# Patient Record
Sex: FEMALE | Race: OTHER | HISPANIC OR LATINO | Employment: UNEMPLOYED | URBAN - METROPOLITAN AREA
[De-identification: names, ages, dates, MRNs, and addresses within clinical notes are randomized per-mention and may not be internally consistent; named-entity substitution may affect disease eponyms.]

---

## 2022-09-14 ENCOUNTER — APPOINTMENT (OUTPATIENT)
Dept: OBGYN | Facility: CLINIC | Age: 27
End: 2022-09-14

## 2022-09-14 ENCOUNTER — GYNECOLOGY VISIT (OUTPATIENT)
Dept: OBGYN | Facility: CLINIC | Age: 27
End: 2022-09-14

## 2022-09-14 VITALS
SYSTOLIC BLOOD PRESSURE: 118 MMHG | DIASTOLIC BLOOD PRESSURE: 76 MMHG | WEIGHT: 145.5 LBS | HEIGHT: 61 IN | BODY MASS INDEX: 27.47 KG/M2

## 2022-09-14 DIAGNOSIS — N93.8 DUB (DYSFUNCTIONAL UTERINE BLEEDING): ICD-10-CM

## 2022-09-14 PROCEDURE — 99203 OFFICE O/P NEW LOW 30 MIN: CPT | Mod: 25 | Performed by: ADVANCED PRACTICE MIDWIFE

## 2022-09-14 PROCEDURE — 76801 OB US < 14 WKS SINGLE FETUS: CPT | Performed by: ADVANCED PRACTICE MIDWIFE

## 2022-09-14 NOTE — PROGRESS NOTES
"Poonam Turpin is a 27 y.o. female who presents for DUB        HPI Comments: Pt presents for DUB.  Patient's last menstrual period was 06/16/2022 (exact date).. She reports periods were regular.    Review of Systems   Pertinent positives documented in HPI and all other systems reviewed & are negative      History reviewed. No pertinent past medical history.    Medications:   No current [facility]-ordered outpatient medications on file.     No current [facility]-ordered facility-administered medications on file.          Objective:   Vital measurements:  /76 (BP Location: Right arm, Patient Position: Sitting, BP Cuff Size: Adult)   Ht 1.56 m (5' 1.42\")   Wt 66 kg (145 lb 8.1 oz)   Body mass index is 27.12 kg/m². (Goal BM I>18 <25)    Physical Exam   Nursing note and vitals reviewed.  Constitutional: She is oriented to person, place, and time. She appears well-developed and well-nourished. No distress.     Genitourinary:  Uterus size of 12   No vaginal bleeding or discharge noted.     Musculoskeletal: Normal range of motion. She exhibits no edema and no tenderness.     Skin: Skin is warm and dry. No rash noted. She is not diaphoretic. No erythema. No pallor.     Psychiatric: She has a normal mood and affect. Her behavior is normal. Judgment and thought content normal.     Transabdominal Ultrasound  Indication: DUB    Findings:  CRL:  6.21 6.22 6.38    FHR: 164 bpm      Impression: Single intrauterine pregnancy measuring 12 weeks and 5 days.     Per ACOG dating guidelines, final ERICK is 3/23/2023 based on LMP consistent with US today    Ultrasound performed and read by myself.              Assessment:     1. DUB (dysfunctional uterine bleeding)            Plan:   1. Discussed importance of prenatal vitamins with patient. Encouraged daily use.  2. Follow up in 1-2 weeks for NOB visit.         "

## 2022-09-14 NOTE — PROGRESS NOTES
Patient here for GYN/DUB.  UPT= Positive  LMP= 06/16/2022  ERICK= 03/23/2022  GA=12W6D  Last pap April [facility]   Phone number: [phone]  Pharmacy verified  No complaints as of today   Partners Name is Primo

## 2022-09-15 ENCOUNTER — APPOINTMENT (OUTPATIENT)
Dept: OBGYN | Facility: CLINIC | Age: 27
End: 2022-09-15

## 2022-09-27 PROBLEM — Z34.00 PREGNANCY, SUPERVISION OF FIRST: Status: ACTIVE | Noted: 2022-09-27

## 2022-10-05 ENCOUNTER — INITIAL PRENATAL (OUTPATIENT)
Dept: OBGYN | Facility: CLINIC | Age: 27
End: 2022-10-05

## 2022-10-05 ENCOUNTER — HOSPITAL ENCOUNTER (OUTPATIENT)
Facility: MEDICAL CENTER | Age: 27
End: 2022-10-05
Attending: NURSE PRACTITIONER
Payer: COMMERCIAL

## 2022-10-05 VITALS — WEIGHT: 151 LBS | DIASTOLIC BLOOD PRESSURE: 62 MMHG | BODY MASS INDEX: 28.15 KG/M2 | SYSTOLIC BLOOD PRESSURE: 100 MMHG

## 2022-10-05 DIAGNOSIS — Z34.00 ENCOUNTER FOR SUPERVISION PREGNANCY IN PRIMIGRAVIDA, ANTEPARTUM: ICD-10-CM

## 2022-10-05 PROCEDURE — 0500F INITIAL PRENATAL CARE VISIT: CPT | Performed by: NURSE PRACTITIONER

## 2022-10-05 PROCEDURE — 8198 PREG CTR PKG RATE (SYSTEM): Performed by: NURSE PRACTITIONER

## 2022-10-05 ASSESSMENT — EDINBURGH POSTNATAL DEPRESSION SCALE (EPDS)
I HAVE BEEN ANXIOUS OR WORRIED FOR NO GOOD REASON: NO, NOT AT ALL
I HAVE BLAMED MYSELF UNNECESSARILY WHEN THINGS WENT WRONG: NO, NEVER
I HAVE BEEN ABLE TO LAUGH AND SEE THE FUNNY SIDE OF THINGS: AS MUCH AS I ALWAYS COULD
THINGS HAVE BEEN GETTING ON TOP OF ME: NO, I HAVE BEEN COPING AS WELL AS EVER
I HAVE FELT SCARED OR PANICKY FOR NO GOOD REASON: NO, NOT AT ALL
I HAVE BEEN SO UNHAPPY THAT I HAVE BEEN CRYING: NO, NEVER
THE THOUGHT OF HARMING MYSELF HAS OCCURRED TO ME: NEVER
TOTAL SCORE: 0
I HAVE LOOKED FORWARD WITH ENJOYMENT TO THINGS: AS MUCH AS I EVER DID
I HAVE FELT SAD OR MISERABLE: NO, NOT AT ALL
I HAVE BEEN SO UNHAPPY THAT I HAVE HAD DIFFICULTY SLEEPING: NOT AT ALL

## 2022-10-05 NOTE — PROGRESS NOTES
Pt. Here for NOB visit.  Pt had a DUB on 9/14/2022  Pt states no complaints.   LMP 6/16/2022  Last pap smear 4/2022 [place] in [place], doesn't think she can get records, pt would like to wait till post partum to do pap smear.   FLU offered and declined.   AFP offered and declined.

## 2022-10-05 NOTE — PROGRESS NOTES
Subjective:   Poonam Turpin is a 27 y.o.  who presents for her new OB exam.  She is 15w6d with an ERICK of Estimated Date of Delivery: 3/23/23 by LMP she was tracking c/w US. She is feeling well and has no concerns at this time. Denies VB, LOF, contractions or pain. No ER visits or previous care in this pregnancy. Denies dysuria, vaginal DC, fever. Reports fetal movement. Declines AFP.  Declines CF.  Declines NIPT testing.     History reviewed. No pertinent past medical history.    Psych Hx: Patient denies any history of depression, anxiety, PTSD, bipolar or any other psychological issues.     EPDS today:     History reviewed. No pertinent surgical history.     OB History    Para Term  AB Living   1             SAB IAB Ectopic Molar Multiple Live Births                    # Outcome Date GA Lbr Jason/2nd Weight Sex Delivery Anes PTL Lv   1 Current                 Gynecological Hx: Denies any hx of STIs, including HSV. Denies any vulvovaginal disorders and no hx of abnormal cervical cytology. Last pap in [place] but no records.     Sexual Hx: One current male partner, who is FOB     Contraceptive Hx: Has not used in the past and has since discontinued use.     Family History   Problem Relation Age of Onset    No Known Problems Mother     No Known Problems Father     No Known Problems Sister     No Known Problems Brother     No Known Problems Maternal Grandmother     No Known Problems Maternal Grandfather     No Known Problems Paternal Grandmother     No Known Problems Paternal Grandfather      Denies any genetic disorders in family history.     Social History     Socioeconomic History    Marital status:      Spouse name: Not on file    Number of children: Not on file    Years of education: Not on file    Highest education level: Not on file   Occupational History    Not on file   Tobacco Use    Smoking status: Never    Smokeless tobacco: Never   Vaping Use    Vaping Use: Never  used   Substance and Sexual Activity    Alcohol use: Not Currently    Drug use: Never    Sexual activity: Yes     Partners: Male     Comment: none   Other Topics Concern    Not on file   Social History Narrative    Not on file     Social Determinants of Health     Financial Resource Strain: Not on file   Food Insecurity: Not on file   Transportation Needs: Not on file   Physical Activity: Not on file   Stress: Not on file   Social Connections: Not on file   Intimate Partner Violence: Not on file   Housing Stability: Not on file       FOB is involved and lives with Poonam Turpin.  Pregnancy is planned, was trying for 5 years.    She is currently not working, denies any heavy lifting or exposure to potential teratogens like environmental or occupational toxins.   Denies alcohol use, drug use, or tobacco use in pregnancy.   Denies any current or hx of sexual, emotional or physical abuse or trauma.     Current Medications: PNV   Allergies: Denies allergies to medications, food, or environmental allergies    Objective:      Vitals:    10/05/22 1032   BP: 100/62   Weight: 151 lb        See Prenatal Physical and Prenatal Vitals      Assessment:      1.  IUP @ 15w6d per LMP      2.  S=D      3.  See problem list as follows       Patient Active Problem List    Diagnosis Date Noted    Supervision of first pregnancy  09/27/2022         Plan:   - GC/CT collected; prefers pap PP  - Declines genetic testing   - Prenatal labs ordered - lab slip given  - Reviewed recommendations for Covid-19 vacc asap   - Discussed PNV, nutrition, adequate water intake, and exercise/weight gain in pregnancy  - NOB informational packet with anticipatory guidance given  - Information on Centering Pregnancy given, n/a  - S/sx of pregnancy warning signs and PTL precautions given  - Complete OB US in 5 wks  - Return to [facility] in 4 wks

## 2022-10-07 LAB
C TRACH DNA SPEC QL NAA+PROBE: NEGATIVE
N GONORRHOEA DNA SPEC QL NAA+PROBE: NEGATIVE
SPEC CONTAINER SPEC: NORMAL
SPEC CONTAINER SPEC: NORMAL
SPECIMEN SOURCE: NORMAL
T VAGINALIS RRNA SPEC QL NAA+PROBE: NEGATIVE

## 2022-10-26 ENCOUNTER — HOSPITAL ENCOUNTER (OUTPATIENT)
Dept: LAB | Facility: MEDICAL CENTER | Age: 27
End: 2022-10-26
Attending: NURSE PRACTITIONER
Payer: COMMERCIAL

## 2022-10-26 DIAGNOSIS — Z34.00 ENCOUNTER FOR SUPERVISION PREGNANCY IN PRIMIGRAVIDA, ANTEPARTUM: ICD-10-CM

## 2022-10-26 LAB
ABO GROUP BLD: NORMAL
APPEARANCE UR: CLEAR
BACTERIA #/AREA URNS HPF: NEGATIVE /HPF
BASOPHILS # BLD AUTO: 0.3 % (ref 0–1.8)
BASOPHILS # BLD: 0.03 K/UL (ref 0–0.12)
BILIRUB UR QL STRIP.AUTO: NEGATIVE
BLD GP AB SCN SERPL QL: NORMAL
COLOR UR: YELLOW
EOSINOPHIL # BLD AUTO: 0.14 K/UL (ref 0–0.51)
EOSINOPHIL NFR BLD: 1.4 % (ref 0–6.9)
EPI CELLS #/AREA URNS HPF: NORMAL /HPF
ERYTHROCYTE [DISTWIDTH] IN BLOOD BY AUTOMATED COUNT: 47.1 FL (ref 35.9–50)
GLUCOSE UR STRIP.AUTO-MCNC: NEGATIVE MG/DL
HBV SURFACE AG SER QL: ABNORMAL
HCT VFR BLD AUTO: 38 % (ref 37–47)
HCV AB SER QL: ABNORMAL
HGB BLD-MCNC: 12.2 G/DL (ref 12–16)
HIV 1+2 AB+HIV1 P24 AG SERPL QL IA: NORMAL
HYALINE CASTS #/AREA URNS LPF: NORMAL /LPF
IMM GRANULOCYTES # BLD AUTO: 0.05 K/UL (ref 0–0.11)
IMM GRANULOCYTES NFR BLD AUTO: 0.5 % (ref 0–0.9)
KETONES UR STRIP.AUTO-MCNC: NEGATIVE MG/DL
LEUKOCYTE ESTERASE UR QL STRIP.AUTO: ABNORMAL
LYMPHOCYTES # BLD AUTO: 3.1 K/UL (ref 1–4.8)
LYMPHOCYTES NFR BLD: 31.3 % (ref 22–41)
MCH RBC QN AUTO: 28.4 PG (ref 27–33)
MCHC RBC AUTO-ENTMCNC: 32.1 G/DL (ref 33.6–35)
MCV RBC AUTO: 88.6 FL (ref 81.4–97.8)
MICRO URNS: ABNORMAL
MONOCYTES # BLD AUTO: 0.57 K/UL (ref 0–0.85)
MONOCYTES NFR BLD AUTO: 5.8 % (ref 0–13.4)
NEUTROPHILS # BLD AUTO: 6.01 K/UL (ref 2–7.15)
NEUTROPHILS NFR BLD: 60.7 % (ref 44–72)
NITRITE UR QL STRIP.AUTO: NEGATIVE
NRBC # BLD AUTO: 0 K/UL
NRBC BLD-RTO: 0 /100 WBC
PH UR STRIP.AUTO: 7.5 [PH] (ref 5–8)
PLATELET # BLD AUTO: 251 K/UL (ref 164–446)
PMV BLD AUTO: 11.1 FL (ref 9–12.9)
PROT UR QL STRIP: NEGATIVE MG/DL
RBC # BLD AUTO: 4.29 M/UL (ref 4.2–5.4)
RBC # URNS HPF: NORMAL /HPF
RBC UR QL AUTO: NEGATIVE
RH BLD: NORMAL
RUBV AB SER QL: >500 IU/ML
SP GR UR STRIP.AUTO: 1.01
T PALLIDUM AB SER QL IA: ABNORMAL
UROBILINOGEN UR STRIP.AUTO-MCNC: 0.2 MG/DL
WBC # BLD AUTO: 9.9 K/UL (ref 4.8–10.8)
WBC #/AREA URNS HPF: NORMAL /HPF

## 2022-10-28 LAB
AMPHET CTO UR CFM-MCNC: NEGATIVE NG/ML
BACTERIA UR CULT: NORMAL
BARBITURATES CTO UR CFM-MCNC: NEGATIVE NG/ML
BENZODIAZ CTO UR CFM-MCNC: NEGATIVE NG/ML
CANNABINOIDS CTO UR CFM-MCNC: NEGATIVE NG/ML
COCAINE CTO UR CFM-MCNC: NEGATIVE NG/ML
DRUG COMMENT 753798: NORMAL
METHADONE CTO UR CFM-MCNC: NEGATIVE NG/ML
OPIATES CTO UR CFM-MCNC: NEGATIVE NG/ML
PCP CTO UR CFM-MCNC: NEGATIVE NG/ML
PROPOXYPH CTO UR CFM-MCNC: NEGATIVE NG/ML
SIGNIFICANT IND 70042: NORMAL
SITE SITE: NORMAL
SOURCE SOURCE: NORMAL

## 2022-11-04 ENCOUNTER — ROUTINE PRENATAL (OUTPATIENT)
Dept: OBGYN | Facility: CLINIC | Age: 27
End: 2022-11-04

## 2022-11-04 VITALS — DIASTOLIC BLOOD PRESSURE: 60 MMHG | SYSTOLIC BLOOD PRESSURE: 106 MMHG | WEIGHT: 153 LBS | BODY MASS INDEX: 28.52 KG/M2

## 2022-11-04 DIAGNOSIS — Z34.02 ENCOUNTER FOR PRENATAL CARE IN SECOND TRIMESTER OF FIRST PREGNANCY: ICD-10-CM

## 2022-11-04 PROCEDURE — 0502F SUBSEQUENT PRENATAL CARE: CPT | Performed by: NURSE PRACTITIONER

## 2022-11-04 NOTE — PROGRESS NOTES
OB follow up   +FM, Denies VB, LOF or UC's  Phone # [phone]  Preferred pharmacy confirmed  U/S scheduled for 11/10/22  Pt denies any problems at this time

## 2022-11-04 NOTE — PROGRESS NOTES
SUBJECTIVE:  Pt is a 27 y.o.   at 20w1d  gestation. Presents today for follow-up prenatal care. Reports no issues at this time.  Reports   fetal movement. Denies regular cramping/contractions, bleeding or leaking of fluid. Denies dysuria, headaches, N/V. Generally feels well today except some legs cramps at night when she drinks less water.      OBJECTIVE:  - See prenatal vitals flow  -   Vitals:    22 0738   BP: 106/60   Weight: 153 lb                 ASSESSMENT:   - IUP at 20w1d    - S=D   -   Patient Active Problem List    Diagnosis Date Noted    Supervision of first pregnancy  2022         PLAN:  - S/sx pregnancy and labor warning signs vs general discomforts discussed  - Fetal movements and/or kick counts reviewed   - Adequate hydration reinforced  - Nutrition/exercise/vitamin education; continue PNV  - Continues to decline genetic testing   - US scheduled  - Declines flu vac today as has a slight cough  - Anticipatory guidance given  - RTC in 4 weeks for follow-up prenatal care

## 2022-11-10 ENCOUNTER — APPOINTMENT (OUTPATIENT)
Dept: RADIOLOGY | Facility: IMAGING CENTER | Age: 27
End: 2022-11-10
Attending: NURSE PRACTITIONER

## 2022-11-10 ENCOUNTER — TELEPHONE (OUTPATIENT)
Dept: OBGYN | Facility: CLINIC | Age: 27
End: 2022-11-10

## 2022-11-10 DIAGNOSIS — Z34.00 ENCOUNTER FOR SUPERVISION PREGNANCY IN PRIMIGRAVIDA, ANTEPARTUM: ICD-10-CM

## 2022-11-10 DIAGNOSIS — Z34.82 ENCOUNTER FOR SUPERVISION OF OTHER NORMAL PREGNANCY IN SECOND TRIMESTER: ICD-10-CM

## 2022-11-10 PROCEDURE — 76817 TRANSVAGINAL US OBSTETRIC: CPT | Mod: TC | Performed by: PHYSICIAN ASSISTANT

## 2022-11-10 PROCEDURE — 76805 OB US >/= 14 WKS SNGL FETUS: CPT | Mod: TC | Performed by: PHYSICIAN ASSISTANT

## 2022-11-11 NOTE — TELEPHONE ENCOUNTER
----- Message from KRANTHI Swenson sent at 11/10/2022 12:01 PM PST -----  F/u US of heart in 3-4 weeks. Ordered please call to help pt schedule.     Pt notified of need to repeat US to recheck on baby's heart d/t limited views on last US. Explained to pt someone from our group will be calling her tomorrow or Monday to schedule her US appt. Advised to clal us back if she does not receive a call. Pt agreed and verbalized understanding.

## 2022-11-28 ENCOUNTER — ROUTINE PRENATAL (OUTPATIENT)
Dept: OBGYN | Facility: CLINIC | Age: 27
End: 2022-11-28

## 2022-11-28 VITALS — WEIGHT: 154 LBS | BODY MASS INDEX: 28.7 KG/M2 | DIASTOLIC BLOOD PRESSURE: 60 MMHG | SYSTOLIC BLOOD PRESSURE: 108 MMHG

## 2022-11-28 DIAGNOSIS — Z34.02 ENCOUNTER FOR PRENATAL CARE IN SECOND TRIMESTER OF FIRST PREGNANCY: ICD-10-CM

## 2022-11-28 PROCEDURE — 0502F SUBSEQUENT PRENATAL CARE: CPT | Performed by: NURSE PRACTITIONER

## 2022-11-28 NOTE — PROGRESS NOTES
OB follow up   + FM, Denies VB, LOF or UC's.  Phone # [phone]  Preferred pharmacy confirmed  Follow-Up U/S scheduled for 12/9/22  3rd trimester lab orders pended and instructions given to patient

## 2022-11-28 NOTE — PROGRESS NOTES
SUBJECTIVE:  Pt is a 27 y.o.   at 23w4d  gestation. Presents today for follow-up prenatal care. Reports no issues at this time.  Reports   fetal movement. Denies regular cramping/contractions, bleeding or leaking of fluid. Denies dysuria, headaches, N/V. Generally feels well today except some mid upper back discomfort.      OBJECTIVE:  - See prenatal vitals flow  -   Vitals:    22 0756   BP: 108/60   Weight: 154 lb                 ASSESSMENT:   - IUP at 23w4d    - S=D   -   Patient Active Problem List    Diagnosis Date Noted    Supervision of first pregnancy  2022         PLAN:  - S/sx pregnancy and labor warning signs vs general discomforts discussed  - Fetal movements and/or kick counts reviewed   - Adequate hydration reinforced  - Nutrition/exercise/vitamin education; continue PNV  - Declines flu and covid vacc  - Third tri labs ordered  - F/u US for heart structures scheduled   - Recommended support belt, warm/cold therapies and tylenol   - Anticipatory guidance given  - RTC in 4 weeks for follow-up prenatal care

## 2022-12-09 ENCOUNTER — APPOINTMENT (OUTPATIENT)
Dept: RADIOLOGY | Facility: IMAGING CENTER | Age: 27
End: 2022-12-09
Attending: NURSE PRACTITIONER

## 2022-12-09 DIAGNOSIS — Z34.82 ENCOUNTER FOR SUPERVISION OF OTHER NORMAL PREGNANCY IN SECOND TRIMESTER: ICD-10-CM

## 2022-12-09 PROCEDURE — 76815 OB US LIMITED FETUS(S): CPT | Mod: TC | Performed by: RADIOLOGY

## 2022-12-20 ENCOUNTER — TELEPHONE (OUTPATIENT)
Dept: OBGYN | Facility: CLINIC | Age: 27
End: 2022-12-20

## 2022-12-20 ENCOUNTER — HOSPITAL ENCOUNTER (OUTPATIENT)
Dept: LAB | Facility: MEDICAL CENTER | Age: 27
End: 2022-12-20
Attending: NURSE PRACTITIONER
Payer: COMMERCIAL

## 2022-12-20 DIAGNOSIS — Z34.02 ENCOUNTER FOR PRENATAL CARE IN SECOND TRIMESTER OF FIRST PREGNANCY: ICD-10-CM

## 2022-12-20 DIAGNOSIS — R73.09 ELEVATED GLUCOSE TOLERANCE TEST: ICD-10-CM

## 2022-12-20 LAB
BASOPHILS # BLD AUTO: 0.2 % (ref 0–1.8)
BASOPHILS # BLD: 0.02 K/UL (ref 0–0.12)
EOSINOPHIL # BLD AUTO: 0.11 K/UL (ref 0–0.51)
EOSINOPHIL NFR BLD: 1.2 % (ref 0–6.9)
ERYTHROCYTE [DISTWIDTH] IN BLOOD BY AUTOMATED COUNT: 47.3 FL (ref 35.9–50)
GLUCOSE 1H P 50 G GLC PO SERPL-MCNC: 150 MG/DL (ref 70–139)
HCT VFR BLD AUTO: 34.8 % (ref 37–47)
HGB BLD-MCNC: 11 G/DL (ref 12–16)
IMM GRANULOCYTES # BLD AUTO: 0.06 K/UL (ref 0–0.11)
IMM GRANULOCYTES NFR BLD AUTO: 0.6 % (ref 0–0.9)
LYMPHOCYTES # BLD AUTO: 2.33 K/UL (ref 1–4.8)
LYMPHOCYTES NFR BLD: 24.9 % (ref 22–41)
MCH RBC QN AUTO: 28.6 PG (ref 27–33)
MCHC RBC AUTO-ENTMCNC: 31.6 G/DL (ref 33.6–35)
MCV RBC AUTO: 90.6 FL (ref 81.4–97.8)
MONOCYTES # BLD AUTO: 0.44 K/UL (ref 0–0.85)
MONOCYTES NFR BLD AUTO: 4.7 % (ref 0–13.4)
NEUTROPHILS # BLD AUTO: 6.39 K/UL (ref 2–7.15)
NEUTROPHILS NFR BLD: 68.4 % (ref 44–72)
NRBC # BLD AUTO: 0 K/UL
NRBC BLD-RTO: 0 /100 WBC
PLATELET # BLD AUTO: 255 K/UL (ref 164–446)
PMV BLD AUTO: 10.5 FL (ref 9–12.9)
RBC # BLD AUTO: 3.84 M/UL (ref 4.2–5.4)
T PALLIDUM AB SER QL IA: NORMAL
WBC # BLD AUTO: 9.4 K/UL (ref 4.8–10.8)

## 2022-12-21 NOTE — TELEPHONE ENCOUNTER
----- Message from KRANTHI Martinez sent at 12/20/2022  1:29 PM PST -----  Elevated 1 hr GTT-> 3 hr ordered        12/20/22  1708 Left message for pt to call back regarding lab results.   12/21/22  0814 Pt notified of abnormal 1hr gtt and need to do 3hr gtt this time. Pt instructed to fast 10-12hrs prior to testing. Pt informed she is only allow to drink plain water during fasting time. Pt will call lab to schedule an appt. Advised to bring a snack for after the test is done. Pt notified will be staying in the labs for the 3hr. Pt agreed to do it Friday 12/23/2022. Pt verbalized understanding.

## 2022-12-23 ENCOUNTER — HOSPITAL ENCOUNTER (OUTPATIENT)
Dept: LAB | Facility: MEDICAL CENTER | Age: 27
End: 2022-12-23
Attending: NURSE PRACTITIONER
Payer: COMMERCIAL

## 2022-12-23 DIAGNOSIS — R73.09 ELEVATED GLUCOSE TOLERANCE TEST: ICD-10-CM

## 2022-12-24 LAB
GLUCOSE 1H P CHAL SERPL-MCNC: 148 MG/DL (ref 65–180)
GLUCOSE 2H P CHAL SERPL-MCNC: 103 MG/DL (ref 65–155)
GLUCOSE 3H P CHAL SERPL-MCNC: 105 MG/DL (ref 65–140)
GLUCOSE BS SERPL-MCNC: 75 MG/DL (ref 65–95)

## 2022-12-29 ENCOUNTER — ROUTINE PRENATAL (OUTPATIENT)
Dept: OBGYN | Facility: CLINIC | Age: 27
End: 2022-12-29

## 2022-12-29 VITALS — SYSTOLIC BLOOD PRESSURE: 112 MMHG | WEIGHT: 161 LBS | DIASTOLIC BLOOD PRESSURE: 60 MMHG | BODY MASS INDEX: 30.01 KG/M2

## 2022-12-29 DIAGNOSIS — Z34.83 ENCOUNTER FOR SUPERVISION OF OTHER NORMAL PREGNANCY IN THIRD TRIMESTER: Primary | ICD-10-CM

## 2022-12-29 PROCEDURE — 0502F SUBSEQUENT PRENATAL CARE: CPT | Performed by: NURSE PRACTITIONER

## 2022-12-29 NOTE — PROGRESS NOTES
OB follow up   + fetal movement.  No VB, LOF or UC's.  Phone # [phone]  Preferred pharmacy confirmed.  TDap vaccine offered but declined  Kick count sheet given today.

## 2022-12-29 NOTE — LETTER
Cuente los Movimientos de lopez Bebé  Otro paso importante para la verónica de lopez bebé    Poonam Yuliya Turpin     [facility]            Dept: [phone]    ¿Cuántas semanas tiene de embarazo? 28w0d    Fecha cuando tiene que comenzar a contar el movimiento: 12/29/22                  [place] debe usar kirby diagrama    Claudia manera en que lopez doctor puede controlar a verónica de lopez bebé es sabiendo cuantas veces se mueve lopez bebé en el útero, o por medio de las “pataditas”.  Usted podrá ayudarle a lopez médico al usar cada día el siguiente diagrama.    Cada día, usted debe prestar atención a cuantas horas le lleva a lopez bebé moverse 10 veces.  Comience a contar en la mañana, lo antes posible después de haberse levantado.    · Primeramente, escriba la hora en que se mueve lopez bebé, hasta llegar a 10 veces.  · Colóquele un check o palomita a cada cuadrito cada vez que lopez bebé se mueva hasta que complete 10 veces.  · Escriba la hora cuando termine de contar 10 veces en la última columna.  · Sume el total del tiempo que le llevó contar los 10 movimientos.  · Finalmente, complete el cuadrito de cuantas horas le llevó hacerlo.    Después de saadia contado los 10 movimientos, ya no tendrá que contar los demás movimientos por el shaan del día.  A la mañana siguiente, comience a contar de nuevo cuantas veces se mueve el bebé desde el momento en que se levante.    ¿Qué tendría que considerarse un “movimiento”?  Es difícil de decirlo porque es distinto de claudia madre a otra, y de un embarazo a otro.  Lo importante es que cuente el movimiento de la misma manera anabela el transcurso de lopez embarazo.  Si tiene preguntas adicionales, pregúntele a lopez doctor.    ¡Cuente cuidadosamente cada día!     MUESTRA:  Semana 28    ¿Cuántas horas le ha llevado sentir 10 movimientos?        Hora de Inicio     1     2     3     4     5     6     7     8     9     10   Hora de Finlizar   Saji. 8:20 ·  ·  ·  ·  ·  ·  ·  ·  ·  ·   11:40   Mar.               Mié.               Flory.               Abbye.               Sáb.               Dom.                 IMPORTANTE:  Usted debe contactar a lopez doctor si le lleva más de 2 horas sentir 10 movimientos de lopez bebé.    Cada mañana, escriba la hora de inicio y comience a contar los movimientos de lopez bebé.  Hágalo colocándole un check o palomita a cada cuadrito cada vez que sienta un movimiento de lopez bebé.  Cuando haya sentido 10 “pataditas”, escriba la hora en que terminó de contar en la última columna.  Luego, complete en la cajita (arriba de la fanny de check o palomita) el número total de horas que le llevó hacerlo.  Asegúrese de leer completamente las instrucciones en la página anterior.

## 2023-01-12 ENCOUNTER — ROUTINE PRENATAL (OUTPATIENT)
Dept: OBGYN | Facility: CLINIC | Age: 28
End: 2023-01-12

## 2023-01-12 VITALS — SYSTOLIC BLOOD PRESSURE: 100 MMHG | BODY MASS INDEX: 30.01 KG/M2 | WEIGHT: 161 LBS | DIASTOLIC BLOOD PRESSURE: 60 MMHG

## 2023-01-12 DIAGNOSIS — Z34.03 ENCOUNTER FOR PRENATAL CARE IN THIRD TRIMESTER OF FIRST PREGNANCY: ICD-10-CM

## 2023-01-12 PROCEDURE — 0502F SUBSEQUENT PRENATAL CARE: CPT | Performed by: NURSE PRACTITIONER

## 2023-01-12 NOTE — PROGRESS NOTES
Pt here today for OB follow up  Pt states no complaints   Reports +  Good # [phone]  Pharmacy Confirmed.  Chaperone offered and not indicated.

## 2023-01-12 NOTE — PROGRESS NOTES
SUBJECTIVE:  Pt is a 27 y.o.   at 30w0d  gestation. Presents today for follow-up prenatal care. Reports no issues at this time.  Reports good  fetal movement. Denies regular cramping/contractions, bleeding or leaking of fluid. Denies dysuria, headaches, N/V. Generally feels well today.     OBJECTIVE:  - See prenatal vitals flow  -   Vitals:    23 0834   BP: 100/60   Weight: 161 lb                 ASSESSMENT:   - IUP at 30w0d    - S=D   -   Patient Active Problem List    Diagnosis Date Noted    Supervision of first pregnancy  2022         PLAN:  - S/sx pregnancy and labor warning signs vs general discomforts discussed  - Fetal movements and/or kick counts reviewed   - Adequate hydration reinforced  - Nutrition/exercise/vitamin education; continue PNV  - Plans for breastfeeding:handout given and reviewed   - Plans unsure for contraception Pp: handout given and reviewed  - Declines vaccines  - Anticipatory guidance given  - RTC in 2 weeks for follow-up prenatal care

## 2023-01-24 ENCOUNTER — ROUTINE PRENATAL (OUTPATIENT)
Dept: OBGYN | Facility: CLINIC | Age: 28
End: 2023-01-24

## 2023-01-24 VITALS — SYSTOLIC BLOOD PRESSURE: 96 MMHG | BODY MASS INDEX: 29.97 KG/M2 | DIASTOLIC BLOOD PRESSURE: 58 MMHG | WEIGHT: 160.8 LBS

## 2023-01-24 DIAGNOSIS — Z34.03 ENCOUNTER FOR PRENATAL CARE IN THIRD TRIMESTER OF FIRST PREGNANCY: ICD-10-CM

## 2023-01-24 PROCEDURE — 0502F SUBSEQUENT PRENATAL CARE: CPT | Performed by: NURSE PRACTITIONER

## 2023-01-24 NOTE — PROGRESS NOTES
Pt. Here for OB/FU. Reports Good FM.   Pt. Denies VB, LOF, or UC's.   Pt states she has no concerns or complaints today.   Baby is meeting LINETTE movement goals.

## 2023-01-24 NOTE — PROGRESS NOTES
SUBJECTIVE:  Pt is a 27 y.o.   at 31w5d  gestation. Presents today for follow-up prenatal care. Reports no issues at this time.  Reports good  fetal movement. Denies regular cramping/contractions, bleeding or leaking of fluid. Denies dysuria, headaches, N/V. Generally feels well today.      OBJECTIVE:  - See prenatal vitals flow  -   Vitals:    23 0938   BP: 96/58   Weight: 160 lb 12.8 oz                 ASSESSMENT:   - IUP at 31w5d    - S=D   -   Patient Active Problem List    Diagnosis Date Noted    Supervision of first pregnancy  2022         PLAN:  - S/sx pregnancy and labor warning signs vs general discomforts discussed  - Fetal movements and/or kick counts reviewed   - Adequate hydration reinforced  - Education provided on where to do classes and tours   - Nutrition/exercise/vitamin education; continue PNV  - Anticipatory guidance given  - RTC in 2 weeks for follow-up prenatal care

## 2023-02-09 ENCOUNTER — ROUTINE PRENATAL (OUTPATIENT)
Dept: OBGYN | Facility: CLINIC | Age: 28
End: 2023-02-09

## 2023-02-09 VITALS — SYSTOLIC BLOOD PRESSURE: 102 MMHG | DIASTOLIC BLOOD PRESSURE: 60 MMHG | BODY MASS INDEX: 30.42 KG/M2 | WEIGHT: 163.2 LBS

## 2023-02-09 DIAGNOSIS — Z34.03 ENCOUNTER FOR PRENATAL CARE IN THIRD TRIMESTER OF FIRST PREGNANCY: ICD-10-CM

## 2023-02-09 PROCEDURE — 0502F SUBSEQUENT PRENATAL CARE: CPT | Performed by: NURSE PRACTITIONER

## 2023-02-09 NOTE — PROGRESS NOTES
SUBJECTIVE:  Pt is a 28 y.o.   at 34w0d  gestation. Presents today for follow-up prenatal care. Reports no issues at this time.  Reports good  fetal movement. Denies regular cramping/contractions, bleeding or leaking of fluid. Denies dysuria, headaches, N/V. Generally feels well today except one episode of lower abdominal pain lasting a few minutes.      OBJECTIVE:  - See prenatal vitals flow  -   Vitals:    23 0737   BP: 102/60   Weight: 163 lb 3.2 oz                 ASSESSMENT:   - IUP at 34w0d    - S=D   -   Patient Active Problem List    Diagnosis Date Noted    Supervision of first pregnancy  2022         PLAN:  - S/sx pregnancy and labor warning signs vs general discomforts discussed  - Fetal movements and/or kick counts reviewed   - Adequate hydration reinforced  - Nutrition/exercise/vitamin education; continue PNV   - Anticipatory guidance given  - Ligament discomforts and remedies reviewed   - RTC in 2 weeks for follow-up prenatal care

## 2023-02-09 NOTE — PROGRESS NOTES
Pt here today for OB follow up  Reports +FM  WT: 163.2 lb   BP: 102/60  Preferred pharmacy verified with pt.  MFM Appointment: not at this time   Pt states no complaints or concerns today  Good # [phone]

## 2023-02-24 ENCOUNTER — HOSPITAL ENCOUNTER (OUTPATIENT)
Facility: MEDICAL CENTER | Age: 28
End: 2023-02-24
Attending: NURSE PRACTITIONER
Payer: COMMERCIAL

## 2023-02-24 ENCOUNTER — ROUTINE PRENATAL (OUTPATIENT)
Dept: OBGYN | Facility: CLINIC | Age: 28
End: 2023-02-24

## 2023-02-24 VITALS — DIASTOLIC BLOOD PRESSURE: 64 MMHG | WEIGHT: 164 LBS | SYSTOLIC BLOOD PRESSURE: 108 MMHG | BODY MASS INDEX: 30.57 KG/M2

## 2023-02-24 DIAGNOSIS — Z34.83 ENCOUNTER FOR SUPERVISION OF OTHER NORMAL PREGNANCY IN THIRD TRIMESTER: Primary | ICD-10-CM

## 2023-02-24 DIAGNOSIS — Z34.83 ENCOUNTER FOR SUPERVISION OF OTHER NORMAL PREGNANCY IN THIRD TRIMESTER: ICD-10-CM

## 2023-02-24 LAB
APPEARANCE UR: NORMAL
BILIRUB UR STRIP-MCNC: NORMAL MG/DL
COLOR UR AUTO: NORMAL
GLUCOSE UR STRIP.AUTO-MCNC: NEGATIVE MG/DL
KETONES UR STRIP.AUTO-MCNC: NORMAL MG/DL
LEUKOCYTE ESTERASE UR QL STRIP.AUTO: NORMAL
NITRITE UR QL STRIP.AUTO: NEGATIVE
PH UR STRIP.AUTO: 6 [PH] (ref 5–8)
PROT UR QL STRIP: 30 MG/DL
RBC UR QL AUTO: NEGATIVE
SP GR UR STRIP.AUTO: 1.02
UROBILINOGEN UR STRIP-MCNC: NORMAL MG/DL

## 2023-02-24 PROCEDURE — 0502F SUBSEQUENT PRENATAL CARE: CPT | Performed by: NURSE PRACTITIONER

## 2023-02-24 PROCEDURE — 81002 URINALYSIS NONAUTO W/O SCOPE: CPT | Performed by: NURSE PRACTITIONER

## 2023-02-24 NOTE — PROGRESS NOTES
OB follow up   + fetal movement.  No VB, LOF or UC's.  Phone # [phone]  Preferred pharmacy confirmed.  GBS today

## 2023-02-25 LAB — GP B STREP DNA SPEC QL NAA+PROBE: NEGATIVE

## 2023-02-28 ENCOUNTER — ROUTINE PRENATAL (OUTPATIENT)
Dept: OBGYN | Facility: CLINIC | Age: 28
End: 2023-02-28

## 2023-02-28 VITALS — WEIGHT: 163.2 LBS | SYSTOLIC BLOOD PRESSURE: 102 MMHG | DIASTOLIC BLOOD PRESSURE: 60 MMHG | BODY MASS INDEX: 30.42 KG/M2

## 2023-02-28 DIAGNOSIS — Z34.03 ENCOUNTER FOR PRENATAL CARE IN THIRD TRIMESTER OF FIRST PREGNANCY: ICD-10-CM

## 2023-02-28 PROCEDURE — 0502F SUBSEQUENT PRENATAL CARE: CPT | Performed by: NURSE PRACTITIONER

## 2023-02-28 ASSESSMENT — EDINBURGH POSTNATAL DEPRESSION SCALE (EPDS)
THINGS HAVE BEEN GETTING ON TOP OF ME: NO, I HAVE BEEN COPING AS WELL AS EVER
I HAVE FELT SAD OR MISERABLE: NO, NOT AT ALL
THE THOUGHT OF HARMING MYSELF HAS OCCURRED TO ME: NEVER
I HAVE BEEN ANXIOUS OR WORRIED FOR NO GOOD REASON: NO, NOT AT ALL
I HAVE BLAMED MYSELF UNNECESSARILY WHEN THINGS WENT WRONG: NO, NEVER
TOTAL SCORE: 0
I HAVE BEEN ABLE TO LAUGH AND SEE THE FUNNY SIDE OF THINGS: AS MUCH AS I ALWAYS COULD
I HAVE BEEN SO UNHAPPY THAT I HAVE HAD DIFFICULTY SLEEPING: NOT AT ALL
I HAVE LOOKED FORWARD WITH ENJOYMENT TO THINGS: AS MUCH AS I EVER DID
I HAVE BEEN SO UNHAPPY THAT I HAVE BEEN CRYING: NO, NEVER
I HAVE FELT SCARED OR PANICKY FOR NO GOOD REASON: NO, NOT AT ALL

## 2023-02-28 NOTE — PROGRESS NOTES
SUBJECTIVE:  Pt is a 28 y.o.   at 36w5d  gestation. Presents today for follow-up prenatal care. Reports no issues at this time.  Reports good  fetal movement. Denies regular cramping/contractions, bleeding or leaking of fluid. Denies dysuria, headaches, N/V. Generally feels well today.     OBJECTIVE:  - See prenatal vitals flow  -   Vitals:    23 1157   BP: 102/60   Weight: 163 lb 3.2 oz                 ASSESSMENT:   - IUP at 36w5d    - S=D   -   Patient Active Problem List    Diagnosis Date Noted    Supervision of first pregnancy  2022         PLAN:  - S/sx pregnancy and labor warning signs vs general discomforts discussed  - Fetal movements and/or kick counts reviewed   - Adequate hydration reinforced  - Nutrition/exercise/vitamin education; continue PNV  - GBS negative  - Anticipatory guidance given  - RTC in 1 weeks for follow-up prenatal care

## 2023-03-09 ENCOUNTER — ROUTINE PRENATAL (OUTPATIENT)
Dept: OBGYN | Facility: CLINIC | Age: 28
End: 2023-03-09

## 2023-03-09 VITALS — WEIGHT: 164 LBS | BODY MASS INDEX: 30.57 KG/M2 | DIASTOLIC BLOOD PRESSURE: 66 MMHG | SYSTOLIC BLOOD PRESSURE: 108 MMHG

## 2023-03-09 DIAGNOSIS — Z34.03 ENCOUNTER FOR PRENATAL CARE IN THIRD TRIMESTER OF FIRST PREGNANCY: ICD-10-CM

## 2023-03-09 PROCEDURE — 0502F SUBSEQUENT PRENATAL CARE: CPT | Performed by: NURSE PRACTITIONER

## 2023-03-09 NOTE — PROGRESS NOTES
Pt. Here for OB/FU. Reports Good FM.   Pt. Denies VB, LOF, or UC's.   Pt states she has no concerns today.        ID: [id number]

## 2023-03-09 NOTE — PROGRESS NOTES
SUBJECTIVE:  Pt is a 28 y.o.   at 38w0d  gestation. Presents today for follow-up prenatal care. Reports no issues at this time.  Reports good  fetal movement. Denies regular cramping/contractions, bleeding or leaking of fluid. Denies dysuria, headaches, N/V. Generally feels well today.     OBJECTIVE:  - See prenatal vitals flow  -   Vitals:    23 1411   BP: 108/66   Weight: 164 lb                 ASSESSMENT:   - IUP at 38w0d    - S=D   -   Patient Active Problem List    Diagnosis Date Noted    Supervision of first pregnancy  2022         PLAN:  - S/sx pregnancy and labor warning signs vs general discomforts discussed  - Fetal movements and/or kick counts reviewed   - Adequate hydration reinforced  - Nutrition/exercise/vitamin education; continue PNV  - Desires IOL at 41 weeks  - Growth US ordered   - Anticipatory guidance given  - RTC in 1 weeks for follow-up prenatal care

## 2023-03-15 ENCOUNTER — ROUTINE PRENATAL (OUTPATIENT)
Dept: OBGYN | Facility: CLINIC | Age: 28
End: 2023-03-15

## 2023-03-15 VITALS — WEIGHT: 165.2 LBS | SYSTOLIC BLOOD PRESSURE: 106 MMHG | DIASTOLIC BLOOD PRESSURE: 58 MMHG | BODY MASS INDEX: 30.79 KG/M2

## 2023-03-15 DIAGNOSIS — Z34.03 ENCOUNTER FOR PRENATAL CARE IN THIRD TRIMESTER OF FIRST PREGNANCY: ICD-10-CM

## 2023-03-15 PROCEDURE — 0502F SUBSEQUENT PRENATAL CARE: CPT

## 2023-03-15 NOTE — PROGRESS NOTES
S:  Poonam here with FOB for routine OB follow up.  Reports good FM.  Denies VB, LOF, RUCs, or vaginal DC. Patient requests VE: Yes. Informed consent for vaginal exam. Patient agrees to vaginal exam: Yes    O:    Vitals:    03/15/23 1538   BP: 106/58   Weight: 165 lb 3.2 oz     See flow sheet.  VE: too posterior to reach, not well tolerated by patient, fetal station -1 to 0    A:    IUP at 38w6d  S<D, suspect due to very low fetal station, growth US scheduled for 3/17  Patient Active Problem List    Diagnosis Date Noted    Supervision of first pregnancy  09/27/2022       P:  1.  Continue FKCs.         2.  Labor precautions given.  Instructions given on where to go.  Pt receptive to education.         3.  D/w pt induction of labor indications, risks/benefits, and recommendations.  We reviewed the process and expectation. All questions answered.        4.  Questions answered.         5.  Encouraged adequate water intake       6.  GBS negative - reviewed w pt.       7.  F/u 1wk and PRN    MEGAN SaldanaNROBERT.

## 2023-03-15 NOTE — PROGRESS NOTES
Pt here today for OB follow up  Negative GBS, pt informed   Reports +FM  WT: 165.2 lb  BP: 106/58  Preferred pharmacy verified with pt.  MFM Appointment: not at this time   Pt states no complaints or concerns today  Patient is scheduled got GEL on 3/30/23 at 9pm and IOL on 3/31/23 at 9am.Pt notified and instructions given today  Good # [phone]

## 2023-03-17 ENCOUNTER — APPOINTMENT (OUTPATIENT)
Dept: RADIOLOGY | Facility: IMAGING CENTER | Age: 28
End: 2023-03-17
Attending: NURSE PRACTITIONER

## 2023-03-17 DIAGNOSIS — Z34.03 ENCOUNTER FOR PRENATAL CARE IN THIRD TRIMESTER OF FIRST PREGNANCY: ICD-10-CM

## 2023-03-17 PROCEDURE — 76816 OB US FOLLOW-UP PER FETUS: CPT | Mod: TC | Performed by: PHYSICIAN ASSISTANT

## 2023-03-22 ENCOUNTER — ROUTINE PRENATAL (OUTPATIENT)
Dept: OBGYN | Facility: CLINIC | Age: 28
End: 2023-03-22

## 2023-03-22 VITALS — DIASTOLIC BLOOD PRESSURE: 68 MMHG | SYSTOLIC BLOOD PRESSURE: 112 MMHG | WEIGHT: 165.6 LBS | BODY MASS INDEX: 30.87 KG/M2

## 2023-03-22 DIAGNOSIS — Z34.03 ENCOUNTER FOR PRENATAL CARE IN THIRD TRIMESTER OF FIRST PREGNANCY: ICD-10-CM

## 2023-03-22 PROCEDURE — 0502F SUBSEQUENT PRENATAL CARE: CPT | Performed by: NURSE PRACTITIONER

## 2023-03-22 NOTE — PROGRESS NOTES
Pt here today for OB follow up  Negative GBS, pt informed  Reports +FM  WT:165.6 lb  BP: 112/68  Preferred pharmacy verified with pt.  MFM Appointment: not at this time   Pt states has been feeling pelvic pressure. States no other complaints or concerns today  Good # [phone]    Patient is scheduled got GEL on Thursday 3/30/23 at 9pm and IOL on Friday 3/31/23 at 9am. Pt has been previously informed.

## 2023-03-22 NOTE — PROGRESS NOTES
SUBJECTIVE:  Pt is a 28 y.o.   at 39w6d  gestation. Presents today for follow-up prenatal care. Reports no issues at this time.  Reports good  fetal movement. Denies regular cramping/contractions, bleeding or leaking of fluid. Denies dysuria, headaches, N/V. Generally feels well today except a lot of pelvic pressure.      OBJECTIVE:  - See prenatal vitals flow  -   Vitals:    23 1424   BP: 112/68   Weight: 165 lb 9.6 oz                 ASSESSMENT:   - IUP at 39w6d    - S=D   - vtx by BSUS  Patient Active Problem List    Diagnosis Date Noted    Supervision of first pregnancy  2022         PLAN:  - S/sx pregnancy and labor warning signs vs general discomforts discussed  - Fetal movements and/or kick counts reviewed   - Adequate hydration reinforced  - Nutrition/exercise/vitamin education; continue PNV  - IOL on 3/30 and 3/31  - RTC PRN

## 2023-03-23 ENCOUNTER — HOSPITAL ENCOUNTER (INPATIENT)
Facility: MEDICAL CENTER | Age: 28
LOS: 2 days | End: 2023-03-25
Attending: OBSTETRICS & GYNECOLOGY | Admitting: OBSTETRICS & GYNECOLOGY
Payer: MEDICAID

## 2023-03-23 LAB
BASOPHILS # BLD AUTO: 0.3 % (ref 0–1.8)
BASOPHILS # BLD: 0.04 K/UL (ref 0–0.12)
EOSINOPHIL # BLD AUTO: 0.03 K/UL (ref 0–0.51)
EOSINOPHIL NFR BLD: 0.2 % (ref 0–6.9)
ERYTHROCYTE [DISTWIDTH] IN BLOOD BY AUTOMATED COUNT: 47.7 FL (ref 35.9–50)
HCT VFR BLD AUTO: 34.6 % (ref 37–47)
HGB BLD-MCNC: 11.5 G/DL (ref 12–16)
HOLDING TUBE BB 8507: NORMAL
IMM GRANULOCYTES # BLD AUTO: 0.07 K/UL (ref 0–0.11)
IMM GRANULOCYTES NFR BLD AUTO: 0.5 % (ref 0–0.9)
LYMPHOCYTES # BLD AUTO: 2.32 K/UL (ref 1–4.8)
LYMPHOCYTES NFR BLD: 15.9 % (ref 22–41)
MCH RBC QN AUTO: 27.9 PG (ref 27–33)
MCHC RBC AUTO-ENTMCNC: 33.2 G/DL (ref 33.6–35)
MCV RBC AUTO: 84 FL (ref 81.4–97.8)
MONOCYTES # BLD AUTO: 0.77 K/UL (ref 0–0.85)
MONOCYTES NFR BLD AUTO: 5.3 % (ref 0–13.4)
NEUTROPHILS # BLD AUTO: 11.38 K/UL (ref 2–7.15)
NEUTROPHILS NFR BLD: 77.8 % (ref 44–72)
NRBC # BLD AUTO: 0 K/UL
NRBC BLD-RTO: 0 /100 WBC
PLATELET # BLD AUTO: 247 K/UL (ref 164–446)
PMV BLD AUTO: 10 FL (ref 9–12.9)
RBC # BLD AUTO: 4.12 M/UL (ref 4.2–5.4)
T PALLIDUM AB SER QL IA: NORMAL
WBC # BLD AUTO: 14.6 K/UL (ref 4.8–10.8)

## 2023-03-23 PROCEDURE — 99283 EMERGENCY DEPT VISIT LOW MDM: CPT

## 2023-03-23 PROCEDURE — 700111 HCHG RX REV CODE 636 W/ 250 OVERRIDE (IP)

## 2023-03-23 PROCEDURE — 59025 FETAL NON-STRESS TEST: CPT | Mod: 26

## 2023-03-23 PROCEDURE — 770002 HCHG ROOM/CARE - OB PRIVATE (112)

## 2023-03-23 PROCEDURE — 36415 COLL VENOUS BLD VENIPUNCTURE: CPT

## 2023-03-23 PROCEDURE — 85025 COMPLETE CBC W/AUTO DIFF WBC: CPT

## 2023-03-23 PROCEDURE — 99283 EMERGENCY DEPT VISIT LOW MDM: CPT | Mod: 25

## 2023-03-23 PROCEDURE — 86780 TREPONEMA PALLIDUM: CPT

## 2023-03-23 RX ORDER — NIFEDIPINE 10 MG/1
10 CAPSULE ORAL
Status: DISCONTINUED | OUTPATIENT
Start: 2023-03-23 | End: 2023-03-24 | Stop reason: HOSPADM

## 2023-03-23 RX ORDER — SODIUM CHLORIDE, SODIUM LACTATE, POTASSIUM CHLORIDE, CALCIUM CHLORIDE 600; 310; 30; 20 MG/100ML; MG/100ML; MG/100ML; MG/100ML
INJECTION, SOLUTION INTRAVENOUS CONTINUOUS
Status: DISCONTINUED | OUTPATIENT
Start: 2023-03-23 | End: 2023-03-25 | Stop reason: HOSPADM

## 2023-03-23 RX ORDER — METHYLERGONOVINE MALEATE 0.2 MG/ML
0.2 INJECTION INTRAVENOUS
Status: COMPLETED | OUTPATIENT
Start: 2023-03-23 | End: 2023-03-24

## 2023-03-23 RX ORDER — LABETALOL HYDROCHLORIDE 5 MG/ML
20-80 INJECTION, SOLUTION INTRAVENOUS
Status: DISCONTINUED | OUTPATIENT
Start: 2023-03-23 | End: 2023-03-24 | Stop reason: HOSPADM

## 2023-03-23 RX ORDER — MISOPROSTOL 200 UG/1
800 TABLET ORAL
Status: DISCONTINUED | OUTPATIENT
Start: 2023-03-23 | End: 2023-03-24 | Stop reason: HOSPADM

## 2023-03-23 RX ORDER — ALUMINA, MAGNESIA, AND SIMETHICONE 2400; 2400; 240 MG/30ML; MG/30ML; MG/30ML
30 SUSPENSION ORAL EVERY 6 HOURS PRN
Status: DISCONTINUED | OUTPATIENT
Start: 2023-03-23 | End: 2023-03-24 | Stop reason: HOSPADM

## 2023-03-23 RX ORDER — ONDANSETRON 2 MG/ML
4 INJECTION INTRAMUSCULAR; INTRAVENOUS EVERY 6 HOURS PRN
Status: DISCONTINUED | OUTPATIENT
Start: 2023-03-23 | End: 2023-03-24 | Stop reason: HOSPADM

## 2023-03-23 RX ORDER — TERBUTALINE SULFATE 1 MG/ML
0.25 INJECTION, SOLUTION SUBCUTANEOUS
Status: DISCONTINUED | OUTPATIENT
Start: 2023-03-23 | End: 2023-03-24 | Stop reason: HOSPADM

## 2023-03-23 RX ORDER — HYDRALAZINE HYDROCHLORIDE 20 MG/ML
5-10 INJECTION INTRAMUSCULAR; INTRAVENOUS
Status: DISCONTINUED | OUTPATIENT
Start: 2023-03-23 | End: 2023-03-24 | Stop reason: HOSPADM

## 2023-03-23 RX ORDER — IBUPROFEN 800 MG/1
800 TABLET ORAL
Status: DISCONTINUED | OUTPATIENT
Start: 2023-03-23 | End: 2023-03-24 | Stop reason: HOSPADM

## 2023-03-23 RX ORDER — LIDOCAINE HYDROCHLORIDE 10 MG/ML
20 INJECTION, SOLUTION INFILTRATION; PERINEURAL
Status: COMPLETED | OUTPATIENT
Start: 2023-03-23 | End: 2023-03-24

## 2023-03-23 RX ORDER — OXYTOCIN 10 [USP'U]/ML
10 INJECTION, SOLUTION INTRAMUSCULAR; INTRAVENOUS
Status: DISCONTINUED | OUTPATIENT
Start: 2023-03-23 | End: 2023-03-24 | Stop reason: HOSPADM

## 2023-03-23 RX ORDER — ACETAMINOPHEN 500 MG
1000 TABLET ORAL
Status: COMPLETED | OUTPATIENT
Start: 2023-03-23 | End: 2023-03-24

## 2023-03-23 RX ORDER — CITRIC ACID/SODIUM CITRATE 334-500MG
30 SOLUTION, ORAL ORAL EVERY 6 HOURS PRN
Status: DISCONTINUED | OUTPATIENT
Start: 2023-03-23 | End: 2023-03-24 | Stop reason: HOSPADM

## 2023-03-23 RX ORDER — ONDANSETRON 4 MG/1
4 TABLET, ORALLY DISINTEGRATING ORAL EVERY 6 HOURS PRN
Status: DISCONTINUED | OUTPATIENT
Start: 2023-03-23 | End: 2023-03-24 | Stop reason: HOSPADM

## 2023-03-23 RX ADMIN — FENTANYL CITRATE 50 MCG: 50 INJECTION, SOLUTION INTRAMUSCULAR; INTRAVENOUS at 23:05

## 2023-03-23 ASSESSMENT — ENCOUNTER SYMPTOMS
MUSCULOSKELETAL NEGATIVE: 1
NAUSEA: 0
ABDOMINAL PAIN: 1
CARDIOVASCULAR NEGATIVE: 1
HEARTBURN: 0
CONSTITUTIONAL NEGATIVE: 1
BLURRED VISION: 0
HEADACHES: 0
NEUROLOGICAL NEGATIVE: 1
SHORTNESS OF BREATH: 0
VOMITING: 0
PSYCHIATRIC NEGATIVE: 1
PHOTOPHOBIA: 0
RESPIRATORY NEGATIVE: 1
FLANK PAIN: 0
EYES NEGATIVE: 1
DOUBLE VISION: 0

## 2023-03-23 ASSESSMENT — PATIENT HEALTH QUESTIONNAIRE - PHQ9
SUM OF ALL RESPONSES TO PHQ9 QUESTIONS 1 AND 2: 0
1. LITTLE INTEREST OR PLEASURE IN DOING THINGS: NOT AT ALL
2. FEELING DOWN, DEPRESSED, IRRITABLE, OR HOPELESS: NOT AT ALL

## 2023-03-23 ASSESSMENT — LIFESTYLE VARIABLES
ALCOHOL_USE: NO
EVER_SMOKED: NEVER

## 2023-03-24 LAB
ERYTHROCYTE [DISTWIDTH] IN BLOOD BY AUTOMATED COUNT: 47.8 FL (ref 35.9–50)
ERYTHROCYTE [DISTWIDTH] IN BLOOD BY AUTOMATED COUNT: 48.7 FL (ref 35.9–50)
HCT VFR BLD AUTO: 28.4 % (ref 37–47)
HCT VFR BLD AUTO: 28.8 % (ref 37–47)
HGB BLD-MCNC: 9 G/DL (ref 12–16)
HGB BLD-MCNC: 9.4 G/DL (ref 12–16)
MCH RBC QN AUTO: 27.3 PG (ref 27–33)
MCH RBC QN AUTO: 27.6 PG (ref 27–33)
MCHC RBC AUTO-ENTMCNC: 31.7 G/DL (ref 33.6–35)
MCHC RBC AUTO-ENTMCNC: 32.6 G/DL (ref 33.6–35)
MCV RBC AUTO: 84.7 FL (ref 81.4–97.8)
MCV RBC AUTO: 86.1 FL (ref 81.4–97.8)
PLATELET # BLD AUTO: 249 K/UL (ref 164–446)
PLATELET # BLD AUTO: 260 K/UL (ref 164–446)
PMV BLD AUTO: 10.3 FL (ref 9–12.9)
PMV BLD AUTO: 10.3 FL (ref 9–12.9)
RBC # BLD AUTO: 3.3 M/UL (ref 4.2–5.4)
RBC # BLD AUTO: 3.4 M/UL (ref 4.2–5.4)
WBC # BLD AUTO: 19.5 K/UL (ref 4.8–10.8)
WBC # BLD AUTO: 20 K/UL (ref 4.8–10.8)

## 2023-03-24 PROCEDURE — 59409 OBSTETRICAL CARE: CPT

## 2023-03-24 PROCEDURE — 700101 HCHG RX REV CODE 250

## 2023-03-24 PROCEDURE — 36415 COLL VENOUS BLD VENIPUNCTURE: CPT

## 2023-03-24 PROCEDURE — 770002 HCHG ROOM/CARE - OB PRIVATE (112)

## 2023-03-24 PROCEDURE — 700111 HCHG RX REV CODE 636 W/ 250 OVERRIDE (IP)

## 2023-03-24 PROCEDURE — 700102 HCHG RX REV CODE 250 W/ 637 OVERRIDE(OP)

## 2023-03-24 PROCEDURE — 85027 COMPLETE CBC AUTOMATED: CPT

## 2023-03-24 PROCEDURE — 700105 HCHG RX REV CODE 258

## 2023-03-24 PROCEDURE — 304965 HCHG RECOVERY SERVICES

## 2023-03-24 PROCEDURE — 0DQR0ZZ REPAIR ANAL SPHINCTER, OPEN APPROACH: ICD-10-PCS | Performed by: OBSTETRICS & GYNECOLOGY

## 2023-03-24 PROCEDURE — A9270 NON-COVERED ITEM OR SERVICE: HCPCS

## 2023-03-24 RX ORDER — OXYCODONE HYDROCHLORIDE 5 MG/1
5 TABLET ORAL EVERY 4 HOURS PRN
Status: DISCONTINUED | OUTPATIENT
Start: 2023-03-24 | End: 2023-03-25 | Stop reason: HOSPADM

## 2023-03-24 RX ORDER — NIFEDIPINE 10 MG/1
10 CAPSULE ORAL
Status: DISCONTINUED | OUTPATIENT
Start: 2023-03-24 | End: 2023-03-25 | Stop reason: HOSPADM

## 2023-03-24 RX ORDER — ACETAMINOPHEN 500 MG
1000 TABLET ORAL EVERY 6 HOURS PRN
Status: DISCONTINUED | OUTPATIENT
Start: 2023-03-24 | End: 2023-03-25 | Stop reason: HOSPADM

## 2023-03-24 RX ORDER — METHYLERGONOVINE MALEATE 0.2 MG/ML
0.2 INJECTION INTRAVENOUS
Status: DISCONTINUED | OUTPATIENT
Start: 2023-03-24 | End: 2023-03-25 | Stop reason: HOSPADM

## 2023-03-24 RX ORDER — VITAMIN A ACETATE, BETA CAROTENE, ASCORBIC ACID, CHOLECALCIFEROL, .ALPHA.-TOCOPHEROL ACETATE, DL-, THIAMINE MONONITRATE, RIBOFLAVIN, NIACINAMIDE, PYRIDOXINE HYDROCHLORIDE, FOLIC ACID, CYANOCOBALAMIN, CALCIUM CARBONATE, FERROUS FUMARATE, ZINC OXIDE, CUPRIC OXIDE 3080; 12; 120; 400; 1; 1.84; 3; 20; 22; 920; 25; 200; 27; 10; 2 [IU]/1; UG/1; MG/1; [IU]/1; MG/1; MG/1; MG/1; MG/1; MG/1; [IU]/1; MG/1; MG/1; MG/1; MG/1; MG/1
1 TABLET, FILM COATED ORAL
Status: DISCONTINUED | OUTPATIENT
Start: 2023-03-24 | End: 2023-03-25 | Stop reason: HOSPADM

## 2023-03-24 RX ORDER — SODIUM CHLORIDE, SODIUM LACTATE, POTASSIUM CHLORIDE, CALCIUM CHLORIDE 600; 310; 30; 20 MG/100ML; MG/100ML; MG/100ML; MG/100ML
INJECTION, SOLUTION INTRAVENOUS PRN
Status: DISCONTINUED | OUTPATIENT
Start: 2023-03-24 | End: 2023-03-25 | Stop reason: HOSPADM

## 2023-03-24 RX ORDER — SIMETHICONE 125 MG
125 TABLET,CHEWABLE ORAL 4 TIMES DAILY PRN
Status: DISCONTINUED | OUTPATIENT
Start: 2023-03-24 | End: 2023-03-25 | Stop reason: HOSPADM

## 2023-03-24 RX ORDER — CALCIUM CARBONATE 500 MG/1
1000 TABLET, CHEWABLE ORAL EVERY 6 HOURS PRN
Status: DISCONTINUED | OUTPATIENT
Start: 2023-03-24 | End: 2023-03-25 | Stop reason: HOSPADM

## 2023-03-24 RX ORDER — ACETAMINOPHEN 500 MG
1000 TABLET ORAL EVERY 4 HOURS PRN
Status: DISCONTINUED | OUTPATIENT
Start: 2023-03-24 | End: 2023-03-25 | Stop reason: HOSPADM

## 2023-03-24 RX ORDER — BISACODYL 10 MG
10 SUPPOSITORY, RECTAL RECTAL PRN
Status: DISCONTINUED | OUTPATIENT
Start: 2023-03-24 | End: 2023-03-25 | Stop reason: HOSPADM

## 2023-03-24 RX ORDER — MISOPROSTOL 200 UG/1
600 TABLET ORAL
Status: DISCONTINUED | OUTPATIENT
Start: 2023-03-24 | End: 2023-03-25 | Stop reason: HOSPADM

## 2023-03-24 RX ORDER — IBUPROFEN 800 MG/1
800 TABLET ORAL EVERY 8 HOURS PRN
Status: DISCONTINUED | OUTPATIENT
Start: 2023-03-24 | End: 2023-03-25 | Stop reason: HOSPADM

## 2023-03-24 RX ORDER — DOCUSATE SODIUM 100 MG/1
100 CAPSULE, LIQUID FILLED ORAL 2 TIMES DAILY PRN
Status: DISCONTINUED | OUTPATIENT
Start: 2023-03-24 | End: 2023-03-25 | Stop reason: HOSPADM

## 2023-03-24 RX ADMIN — GENTAMICIN SULFATE 310 MG: 40 INJECTION, SOLUTION INTRAMUSCULAR; INTRAVENOUS at 03:15

## 2023-03-24 RX ADMIN — PRENATAL WITH FERROUS FUM AND FOLIC ACID 1 TABLET: 3080; 920; 120; 400; 22; 1.84; 3; 20; 10; 1; 12; 200; 27; 25; 2 TABLET ORAL at 10:37

## 2023-03-24 RX ADMIN — SODIUM CHLORIDE, POTASSIUM CHLORIDE, SODIUM LACTATE AND CALCIUM CHLORIDE: 600; 310; 30; 20 INJECTION, SOLUTION INTRAVENOUS at 02:22

## 2023-03-24 RX ADMIN — AMPICILLIN SODIUM 2000 MG: 2 INJECTION, POWDER, FOR SOLUTION INTRAVENOUS at 19:57

## 2023-03-24 RX ADMIN — LIDOCAINE HYDROCHLORIDE 20 ML: 10 INJECTION, SOLUTION INFILTRATION; PERINEURAL at 03:20

## 2023-03-24 RX ADMIN — FENTANYL CITRATE 100 MCG: 50 INJECTION, SOLUTION INTRAMUSCULAR; INTRAVENOUS at 03:46

## 2023-03-24 RX ADMIN — OXYTOCIN 20 UNITS: 10 INJECTION, SOLUTION INTRAMUSCULAR; INTRAVENOUS at 03:42

## 2023-03-24 RX ADMIN — AMPICILLIN SODIUM 2000 MG: 2 INJECTION, POWDER, FOR SOLUTION INTRAVENOUS at 02:20

## 2023-03-24 RX ADMIN — AMPICILLIN SODIUM 2000 MG: 2 INJECTION, POWDER, FOR SOLUTION INTRAVENOUS at 13:55

## 2023-03-24 RX ADMIN — ACETAMINOPHEN 1000 MG: 500 TABLET, FILM COATED ORAL at 01:43

## 2023-03-24 RX ADMIN — LIDOCAINE HYDROCHLORIDE 20 ML: 10 INJECTION, SOLUTION INFILTRATION; PERINEURAL at 03:41

## 2023-03-24 RX ADMIN — AMPICILLIN SODIUM 2000 MG: 2 INJECTION, POWDER, FOR SOLUTION INTRAVENOUS at 08:20

## 2023-03-24 RX ADMIN — OXYTOCIN 125 ML/HR: 10 INJECTION, SOLUTION INTRAMUSCULAR; INTRAVENOUS at 04:45

## 2023-03-24 ASSESSMENT — PAIN DESCRIPTION - PAIN TYPE
TYPE: ACUTE PAIN
TYPE: ACUTE PAIN

## 2023-03-24 NOTE — L&D DELIVERY NOTE
"Poonam Turpin is a 28 y.o. female    VAGINAL DELIVERY NOTE:    OB History    Para Term  AB Living   1             SAB IAB Ectopic Molar Multiple Live Births                    # Outcome Date GA Lbr Jason/2nd Weight Sex Delivery Anes PTL Lv   1 Current                Weeks gestation: 40w1d  Diagnosis:   IUP at 40w1d  Spontaneous labor  OP/deflexed fetal presentation      Labor course: Poonam was admitted on 3/23/2023 with/for spontaneous labor. Induction/augmentation measures: none. She progressed to complete dilation around 0050 and began active second stage spontaneously shortly thereafter. Pushing efforts were adequate, but second stage was prolonged due to direct OP and deflexed fetal presentation. At around 0130, noted increase in fetal tachy accompanied by maternal temp to 100.3f. Given the severity of tachycardia, presumption of IAI. Amp and gent were initiated and tylenol given to manage fever. At 0307 after approximately 2 hours of active second stage, Dr. Langley was requested to bedside due to suspicion of deflexed OP presentation. After Dr. Langley evaluated the patient, decision was made to continue pushing.     On 3/24/2023  at 0339, this 28 y.o.  at 40w1d , GBS negative female delivered via NSVB under no anesthesia. Delivery of a viable Female infant with APGAR scores of 8 and 9 at one and five minutes respectively. Fetal head presented in direct OP and restituted LOP with right shoulder anterior. Nuchal cord present: yes x1, loose, delivered through. Shoulders delivered easily and infant to maternal abdomen for delayed cord clamping until cessation of cord pulsations (>60 seconds).  Spontaneous cry. Cord doubly clamped by this CNM and cut by FOB \"Primo\".  Spontaneous delivery of placenta, grossly intact with 3VC. Pitocin infusing in IVF. Fundus firm with light lochia.  Upon examination of the vaginal vault, cervix, perineum, and vulva, a third degree laceration was " identified. Dr. Langley was present at bedside for delivery and took over repair once 3rd degree was identified. Please see note by Dr. Langley regarding laceration repair.     EBL: 500 ml.   Sponge and needle counts correct: yes    Infant weight: 3455g    Tolerated procedure well  Pt and infant are stable and bonding.  Patient and infant will be transferred to postpartum unit to recover when appropriate  Anticipate routine postpartum care with discharge home expected on PPD#2  Plan CBC in 4 hours  Plan removal of vaginal packing prior to transfer to postpartum    Berenice BOOTHM, APRN  Dr Langley is the attending MD today and was present for delivery and completed the repair.

## 2023-03-24 NOTE — PROGRESS NOTES
"Pharmacy Gentamicin Kinetics Note for 3/24/2023     28 y.o. female on Gentamicin day # 2    Gentamicin Indication: Maternal fever (infection)     Provider specified end date: 23    Active Antibiotics (From admission, onward)      Ordered     Ordering Provider       Fri Mar 24, 2023  1:35 AM    23 0135  ampicillin (Omnipen) 2,000 mg in  mL IVPB  EVERY 6 HOURS         Berenice Peres C.N.M.    23 0135  gentamicin (GARAMYCIN) 250 mg in  mL IVPB  EVERY 24 HOURS         Berenice Peres, C.N.M.            Dosing Weight: 62.5 kg (137 lb 10.8 oz)    Admission History: Admitted on 3/23/2023 for Indication for care in labor or delivery [O75.9]   Pertinent history: patient has temp of 100.3, MD notified starting gent and amp for infection    Allergies:     Patient has no known allergies.     Pertinent cultures to date:      Results       ** No results found for the last 336 hours. **            Labs:    CrCl cannot be calculated (No successful lab value found.).  Recent Labs     23   WBC 14.6*   NEUTSPOLYS 77.80*     No results for input(s): BUN, CREATININE, ALBUMIN in the last 72 hours.  No results for input(s): GENTTROUGH, GENTPEAK, GENTRANDOM in the last 72 hours.  No intake or output data in the 24 hours ending 23 0213  /76   Pulse 83   Temp 36.7 °C (98 °F) (Temporal)   Resp 15   Ht 1.575 m (5' 2\")   Wt 74.8 kg (165 lb)   SpO2 99%  Temp (24hrs), Av.7 °C (98 °F), Min:36.7 °C (98 °F), Max:36.7 °C (98 °F)      List concerns for Gentamicin clearance:     None    A/P:     - Gentamicin dose: 310 mg every 24 hours.      - Next gentamicin level: none ordered        - Comments: Dose 310 every  24 hours for 2 days ordered for maternal fever    Irma Wiggins, PharmD      "

## 2023-03-24 NOTE — CARE PLAN
The patient is Stable - Low risk of patient condition declining or worsening    Shift Goals  Clinical Goals: Lochia WDL    Progress made toward(s) clinical / shift goals:      Problem: Knowledge Deficit - Postpartum  Goal: Patient will verbalize and demonstrate understanding of self and infant care  Outcome: Progressing  Pt interacting with infant appropriately and breastfeeding independently.     Problem: Altered Physiologic Condition  Goal: Patient physiologically stable as evidenced by normal lochia, palpable uterine involution and vitals within normal limits  Outcome: Progressing  Pt maintaining firm fundus and lochia WDL.     Patient is not progressing towards the following goals:

## 2023-03-24 NOTE — PROGRESS NOTES
Report received from Macrina MAGALLANES, POC discussed. Pt v/s stable.    Vag packing removed per Berenice BAINS, RN not present at bedside.    Pt and infant transported to PPU via wheelchair, FOB walking alongside. Report given to Pawel MAGALLANES, POC discussed, bands verified, and care relinquished.

## 2023-03-24 NOTE — PROGRESS NOTES
1814 - Patient of [facility] presents with c/o  labor.  Tapia Gestation today at 40 weeks     via [employer] language line utilized.     Reports contractions started around 1400 today. Denies problems with Pregnancy, denies ROM or bleeding. Denies change to vision/edema/HA, Reports FM. FM/TOCO use discussed and placed, POC discussed.     SVE 3/70/-2 scant pink mucous, no excess fluid noted on the exam glove     Dry erase board updated with RN contact information; reviewed.   Patient encouraged to call RN with all questions concerns needs prn.  Water/ice available/encouraged at the BS    Report to Dr. Langley regarding patient arrival/complaint/status. Will plan to reassess in 1 hour.

## 2023-03-24 NOTE — H&P
History and Physical      Poonam Turpin is a 28 y.o. female  at 40w0d who presents for contractions starting around 1430, progressively stronger and closer together.     Subjective:       positive  For CTXS.   positive Feels pain   negative for LOF  negative for vaginal bleeding.   positive for fetal movement    ROS: Pertinent items are noted in HPI.  Review of Systems   Constitutional: Negative.    HENT: Negative.  Negative for tinnitus.    Eyes: Negative.  Negative for blurred vision, double vision and photophobia.   Respiratory: Negative.  Negative for shortness of breath.    Cardiovascular: Negative.  Negative for leg swelling.   Gastrointestinal:  Positive for abdominal pain (with contractions). Negative for heartburn, nausea and vomiting.   Genitourinary:  Negative for dysuria, flank pain, frequency, hematuria and urgency.   Musculoskeletal: Negative.    Skin: Negative.    Neurological: Negative.  Negative for headaches.   Psychiatric/Behavioral: Negative.         No past medical history on file.  No past surgical history on file.  OB History    Para Term  AB Living   1             SAB IAB Ectopic Molar Multiple Live Births                    # Outcome Date GA Lbr Jason/2nd Weight Sex Delivery Anes PTL Lv   1 Current              Social History     Socioeconomic History    Marital status:      Spouse name: Not on file    Number of children: Not on file    Years of education: Not on file    Highest education level: Not on file   Occupational History    Not on file   Tobacco Use    Smoking status: Never    Smokeless tobacco: Never   Vaping Use    Vaping Use: Never used   Substance and Sexual Activity    Alcohol use: Not Currently    Drug use: Never    Sexual activity: Yes     Partners: Male     Comment: none   Other Topics Concern    Not on file   Social History Narrative    Not on file     Social Determinants of Health     Financial Resource Strain: Not on file   Food  Insecurity: Not on file   Transportation Needs: Not on file   Physical Activity: Not on file   Stress: Not on file   Social Connections: Not on file   Intimate Partner Violence: Not on file   Housing Stability: Not on file     Allergies: Patient has no known allergies.    Current Facility-Administered Medications:     LR infusion, , Intravenous, Continuous, Berenice Peres C.N.M.    lidocaine (XYLOCAINE) 1%  injection, 20 mL, Subcutaneous, Once PRN, FRANCES Saldana.N.M.    terbutaline (BRETHINE) injection 0.25 mg, 0.25 mg, Subcutaneous, Once PRN, FRANCES Saldana.N.M.    oxytocin (PITOCIN) infusion (for post delivery), 1,000 mL, Intravenous, Once **FOLLOWED BY** oxytocin (PITOCIN) infusion (for post delivery), 125 mL/hr, Intravenous, Continuous, FRANCES Saldana.N.M.    oxytocin (PITOCIN) injection 10 Units, 10 Units, Intramuscular, Once PRN, FRANCES Saldana.N.M.    ibuprofen (MOTRIN) tablet 800 mg, 800 mg, Oral, Once PRN, Berenice Peres C.N.M.    acetaminophen (TYLENOL) tablet 1,000 mg, 1,000 mg, Oral, Once PRN, Berenice Peres C.N.M.    fentaNYL (SUBLIMAZE) injection 50 mcg, 50 mcg, Intravenous, Q HOUR PRN **OR** fentaNYL (SUBLIMAZE) injection 100 mcg, 100 mcg, Intravenous, Q HOUR PRN, FRANCES Saldana.N.M.    ondansetron (ZOFRAN ODT) dispertab 4 mg, 4 mg, Oral, Q6HRS PRN **OR** ondansetron (ZOFRAN) syringe/vial injection 4 mg, 4 mg, Intravenous, Q6HRS PRN, FRANCES Saldana.N.M.    labetalol (NORMODYNE/TRANDATE) injection 20-80 mg, 20-80 mg, Intravenous, Q10 MIN PRN **OR** hydrALAZINE (APRESOLINE) injection 5-10 mg, 5-10 mg, Intravenous, Q20MIN PRN **OR** NIFEdipine IR (PROCARDIA) capsule 10 mg, 10 mg, Oral, Q20MIN PRN, FRANCES Saldana.N.M.    mag hydrox-al hydrox-simeth (MAALOX PLUS ES or MYLANTA DS) suspension 30 mL, 30 mL, Oral, Q6HRS PRN, FRANCES Saldana.N.M.    Na citrate-citric acid (BICITRA) 500-334 MG/5ML solution 30 mL, 30 mL, Oral, Q6HRS PRN, Berenice A  "ZAFAR Peres    miSOPROStol (CYTOTEC) tablet 800 mcg, 800 mcg, Rectal, Once PRN, Berenice Peres C.N.M.    methylergonovine (METHERGINE) injection 0.2 mg, 0.2 mg, Intramuscular, Once PRN, Berenice Peres C.N.M.    Prenatal care with [facility] starting at 15w6d with following problems:  Patient Active Problem List    Diagnosis Date Noted    Indication for care in labor or delivery 03/23/2023    Supervision of first pregnancy  09/27/2022           Objective:      /80   Pulse 88   Temp 36.7 °C (98 °F) (Temporal)   Resp 15   Ht 1.575 m (5' 2\")   Wt 74.8 kg (165 lb)   SpO2 99%     General:   alert, cooperative, intermittent distress with contractions   Skin:   normal   HEENT:  EOMI and Sclera clear, anicteric   Lungs:   CTA bilateral   Heart:   S1, S2 normal, no murmur, click, rub or gallop, regular rate and rhythm, chest is clear without rales or wheezing   Abdomen:   gravid, NT   EFW:  3000g   FHT:  140 BPM   Uterine Size: S<D, suspect low fetal station   Presentations: Cephalic   Cervix:     Dilation: 3cm progressed to 7cm in triage    Effacement: 90%    Station:  -1    Consistency:     Position:      Lab Review  Lab:   Blood type: O +, antibody negative  Elevated 1hr GTT >3hr GTT WNL (0/4 elevations)  Recent growth on 3/17, EFW 36.9 percentile, AC 44.7 percentile, 3308g         Recent Results (from the past 5880 hour(s))   CHLAMYDIA/GC AMP URINE OR SWAB    Collection Time: 10/05/22 11:04 AM   Result Value Ref Range    Source: Swab     Specimen Source Vaginal     Gc By Dna Probe Negative Negative    Chlamydia By Dna Probe Negative Negative   TRICHOMONAS VAGINALIS BY TMA    Collection Time: 10/05/22 11:04 AM   Result Value Ref Range    APTIMA Media Type Aptima Swab     T. vaginalis by TMA Negative Negative   URINE CULTURE(NEW)    Collection Time: 10/26/22  6:23 AM    Specimen: Urine   Result Value Ref Range    Significant Indicator NEG     Source UR     Site -     Culture Result Usual skin [place] " ,000 cfu/mL    URINE DRUG SCREEN W/CONF (AR)    Collection Time: 10/26/22  6:23 AM   Result Value Ref Range    Urine Amphetamine-Methamphetam Negative Cutoff 300 ng/mL    Barbiturates Negative Cutoff 200 ng/mL    Benzodiazepines Negative Cutoff 200 ng/mL    Propoxyphene Negative Cutoff 300 ng/mL    Cocaine Metabolite Negative Cutoff 150 ng/mL    Methadone Negative Cutoff 150 ng/mL    Codeine-Morphine Negative Cutoff 300 ng/mL    Phencyclidine -Pcp Negative Cutoff 25 ng/mL    Cannabinoid Metab Negative Cutoff 20 ng/mL    Drug Comment Urine Drugs See Note    PREG CNTR PRENATAL PN    Collection Time: 10/26/22  6:23 AM   Result Value Ref Range    Color Yellow     Character Clear     Specific Gravity 1.009 <1.035    Ph 7.5 5.0 - 8.0    Glucose Negative Negative mg/dL    Ketones Negative Negative mg/dL    Protein Negative Negative mg/dL    Bilirubin Negative Negative    Urobilinogen, Urine 0.2 Negative    Nitrite Negative Negative    Leukocyte Esterase Trace (A) Negative    Occult Blood Negative Negative    Micro Urine Req Microscopic     WBC 9.9 4.8 - 10.8 K/uL    RBC 4.29 4.20 - 5.40 M/uL    Hemoglobin 12.2 12.0 - 16.0 g/dL    Hematocrit 38.0 37.0 - 47.0 %    MCV 88.6 81.4 - 97.8 fL    MCH 28.4 27.0 - 33.0 pg    MCHC 32.1 (L) 33.6 - 35.0 g/dL    RDW 47.1 35.9 - 50.0 fL    Platelet Count 251 164 - 446 K/uL    MPV 11.1 9.0 - 12.9 fL    Neutrophils-Polys 60.70 44.00 - 72.00 %    Lymphocytes 31.30 22.00 - 41.00 %    Monocytes 5.80 0.00 - 13.40 %    Eosinophils 1.40 0.00 - 6.90 %    Basophils 0.30 0.00 - 1.80 %    Immature Granulocytes 0.50 0.00 - 0.90 %    Nucleated RBC 0.00 /100 WBC    Neutrophils (Absolute) 6.01 2.00 - 7.15 K/uL    Lymphs (Absolute) 3.10 1.00 - 4.80 K/uL    Monos (Absolute) 0.57 0.00 - 0.85 K/uL    Eos (Absolute) 0.14 0.00 - 0.51 K/uL    Baso (Absolute) 0.03 0.00 - 0.12 K/uL    Immature Granulocytes (abs) 0.05 0.00 - 0.11 K/uL    NRBC (Absolute) 0.00 K/uL    Hepatitis C Antibody Non-Reactive  Non-Reactive    Hepatitis B Surface Antigen Non-Reactive Non-Reactive    Rubella IgG Antibody >500 IU/mL    Syphilis, Treponemal Qual Non-Reactive Non-Reactive   HIV AG/AB COMBO ASSAY SCREENING    Collection Time: 10/26/22  6:23 AM   Result Value Ref Range    HIV Ag/Ab Combo Assay Non-Reactive Non Reactive   OP Prenatal Panel-Blood Bank    Collection Time: 10/26/22  6:23 AM   Result Value Ref Range    ABO Grouping Only O     Rh Grouping Only POS     Antibody Screen Scrn NEG    URINE MICROSCOPIC (W/UA)    Collection Time: 10/26/22  6:23 AM   Result Value Ref Range    WBC 0-2 /hpf    RBC 0-2 /hpf    Bacteria Negative None /hpf    Epithelial Cells Few /hpf    Hyaline Cast 0-2 /lpf   CBC WITH DIFFERENTIAL    Collection Time: 12/20/22  7:30 AM   Result Value Ref Range    WBC 9.4 4.8 - 10.8 K/uL    RBC 3.84 (L) 4.20 - 5.40 M/uL    Hemoglobin 11.0 (L) 12.0 - 16.0 g/dL    Hematocrit 34.8 (L) 37.0 - 47.0 %    MCV 90.6 81.4 - 97.8 fL    MCH 28.6 27.0 - 33.0 pg    MCHC 31.6 (L) 33.6 - 35.0 g/dL    RDW 47.3 35.9 - 50.0 fL    Platelet Count 255 164 - 446 K/uL    MPV 10.5 9.0 - 12.9 fL    Neutrophils-Polys 68.40 44.00 - 72.00 %    Lymphocytes 24.90 22.00 - 41.00 %    Monocytes 4.70 0.00 - 13.40 %    Eosinophils 1.20 0.00 - 6.90 %    Basophils 0.20 0.00 - 1.80 %    Immature Granulocytes 0.60 0.00 - 0.90 %    Nucleated RBC 0.00 /100 WBC    Neutrophils (Absolute) 6.39 2.00 - 7.15 K/uL    Lymphs (Absolute) 2.33 1.00 - 4.80 K/uL    Monos (Absolute) 0.44 0.00 - 0.85 K/uL    Eos (Absolute) 0.11 0.00 - 0.51 K/uL    Baso (Absolute) 0.02 0.00 - 0.12 K/uL    Immature Granulocytes (abs) 0.06 0.00 - 0.11 K/uL    NRBC (Absolute) 0.00 K/uL   T.PALLIDUM AB HERMINIO (SCREENING)    Collection Time: 12/20/22  7:30 AM   Result Value Ref Range    Syphilis, Treponemal Qual Non-Reactive Non-Reactive   GLUCOSE 1HR GESTATIONAL    Collection Time: 12/20/22  7:30 AM   Result Value Ref Range    Glucose, Post Dose 150 (H) 70 - 139 mg/dL   GTT  (GESTATIONAL  GLUCOSE 3 HR)    Collection Time: 22  7:49 AM   Result Value Ref Range    Baseline Glucose 75 65 - 95 mg/dL    Glucose 1 Hour 148 65 - 180 mg/dL    Glucose 2 Hour 103 65 - 155 mg/dL    Glucose 3 Hour 105 65 - 140 mg/dL   GRP B STREP, BY PCR (PRO BROTH)    Collection Time: 23  3:48 PM    Specimen: Genital   Result Value Ref Range    Strep Gp B DNA PCR Negative Negative   POCT Urinalysis    Collection Time: 23  4:00 PM   Result Value Ref Range    POC Color      POC Appearance      POC Glucose NEGATIVE Negative mg/dL    POC Bilirubin      POC Ketones trace Negative mg/dL    POC Specific Gravity 1.025 <1.005 - >1.030    POC Blood negative Negative    POC Urine PH 6.0 5.0 - 8.0    POC Protein 30 Negative mg/dL    POC Urobiligen      POC Nitrites negative Negative    POC Leukocyte Esterase small Negative        Assessment:   Poonam Turpin at 40w0d  Labor status: Active phase labor, spontaneous.  Obstetrical history significant for   Patient Active Problem List    Diagnosis Date Noted    Indication for care in labor or delivery 2023    Supervision of first pregnancy  2022   .      Plan:   Admit to L&D  GBS negative  Discuss pain management options - does not desire epidural, aware of options  Anticipate      MEGAN SaldanaNJUAN  Attending: Dr. Langley

## 2023-03-24 NOTE — PROGRESS NOTES
0930 - Patient transferred from labor and delivery in wheelchair with infant in arms and FOB accompanying with belongings. Two RN verification of infant and parent armbands. Report received from PATSY weaver RN. Patient oriented to unit and POC. Assessment completed, fundus firm, lochia light. Patient has a cueva catheter in place with adequate output. Pain management discussed. Patient declines intervention for pain at this time. Will call for PRN pain medication. IV patent, no s/s of infiltration at insertion site. Parents educated to offer feedings on cue, at minimum of Q3 hours. Parents verbalize understanding and will update infant I&O chart. Parents educated on room, medications, safe sleep, emergency cord, and infant care. Patient encouraged to call with needs. Call light within reach. All concerns and questions answered at this time.    1115 - Clarified with Dr. Kelly, patient to continue scheduled antibiotic doses.

## 2023-03-24 NOTE — PROGRESS NOTES
"S: Pt is laboring well, no rectal pressure yet. Unmedicated.      O:    Vitals:    23 1815 23 2100   BP: 135/80 127/76   Pulse: 88 83   Resp: 15    Temp: 36.7 °C (98 °F)    TempSrc: Temporal    SpO2: 99%    Weight: 74.8 kg (165 lb)    Height: 1.575 m (5' 2\")            FHTs:  Baseline 155, + accels, variable decel - nonrepetitive, moderate variability        [place]: Contractions q 1-2 minutes, moderate to palpation        SVE: 9/100/0   Labs: reviewed, H&H 11.5/34.6, plt 247  SROM 23 at 2230 for clear fluid      A/P:  1.  IUP @ 40w0d            2.  Reactive, Cat 2 FHTs             3.  Labor progress: active labor, likely progressing toward second stage shortly           4.  Plan: Expectant management           5.  Anticipate       Berenice BOOTHM  Attending MD: Dr. Langley   "

## 2023-03-24 NOTE — PROGRESS NOTES
PROCEDURE NOTE - REPAIR OF 3RD DEGREE LACERATION    I was present for the entire vaginal delivery.     30 cc of 1% lidocaine was injected into the perineum and laceration.  100 mcg of fentanyl was administered IV.    A third degree midline laceration was noted involving approximately half of the anal sphincter.  This sphincter capsule was repaired with 3 figure-of-eight's of 0 Vicryl.  The remainder of the laceration was repaired with 2-0 chromic in the usual fashion.    Inspection of the right labia and vaginal wall revealed a hematoma approximately 4 x 3 cm.  A small amount of bleeding was noted near the hymenal ring.  Multiple figure-of-eight's of 3-0 chromic were placed to attempt to obtain hemostasis.  The tissue was very friable due to the clot, so the vagina was packed.  A Galarza catheter was placed into the urethra.    Plan to remove packing after 4 hours.

## 2023-03-24 NOTE — PROGRESS NOTES
1900: report received from ELPIDIO Vaughn  1930: DION 7/100/-1. Report given to ELPIDIO Schrader. Pt transferred to L&D via wheelchair for admit

## 2023-03-24 NOTE — ED PROVIDER NOTES
"S: Pt is a 28 y.o.  at 40w0d with Estimated Date of Delivery: 3/23/23 who presents to triage c/o contractions starting at 1430 that have become progressively more frequent and more intense.  No VB, LOF.  Reports active FM.      O: /80   Pulse 88   Temp 36.7 °C (98 °F) (Temporal)   Resp 15   Ht 1.575 m (5' 2\")   Wt 74.8 kg (165 lb)   LMP 2022 (Exact Date)   SpO2 99%   BMI 30.18 kg/m²          FHTs: baseline 145, + accels, no decels, mod variability       [place]: regular UCs, q1-2 min       SVE: per RN was initially 3cm and progressed over one hour to 7//-1, cephalic         A/P  Patient Active Problem List    Diagnosis Date Noted    Indication for care in labor or delivery 2023    Supervision of first pregnancy  2022       1.  IUP @ 40w0d  2.  Reactive cat 1 NST seen and read by me  3.  Active spontaneous labor  4.  Admit to L&D, see H&P    Berenice Peres C.N.M.  Attending MD: Dr. Langley  "

## 2023-03-24 NOTE — PROGRESS NOTES
193: Report received from Moriah MAGALLANES, SVE as charted, Poc discussed.     : Pt reporting increased pressure, SVE as charted, this RN updated Berenice CNM on SVE/pt status.     : Pt called out reporting SROM, this RN to bedside, SROM noted clear fluid, this RN updated Berenice CNM on SVE/SROM/pt status.     0100 :Pt reporting involuntary pushing, SVE as charted, this RN telephoned Berenice CNM, updated on SVEpt status     0135: Berenice CNM at bedside to evaluate pt status/FHT/pt pushing efforts. Orders received, see orders for details.     0241: This RN telephoned Berenice CNM, updated on pt status, requested at bedside     0310: Dr Langley at bedside to evaluate pt    0339:  viable female infant, APGARS 8/9

## 2023-03-24 NOTE — PROGRESS NOTES
"S: Pt is pushing with decent effort. Unmedicated. FOB present and supportive at bedside.      O:    Vitals:    03/23/23 1815 03/23/23 2100   BP: 135/80 127/76   Pulse: 88 83   Resp: 15    Temp: 36.7 °C (98 °F)    TempSrc: Temporal    SpO2: 99%    Weight: 74.8 kg (165 lb)    Height: 1.575 m (5' 2\")            FHTs:  Baseline 180 - this is a new development that started with a temp of 100.3, + accels, rare variable decels, moderate variability        [place]: Contractions q 2-3 minutes, moderate to palpation        SVE: C/C/+3, fetal scalp visible at introitus with pushing  Temp 100.3      A/P:  1.  IUP @ 40w1d            2.  Reactive, Cat 2 FHTs             3.  IAI: Start ampicillin and gentamycin, 1g tylenol now, intrauterine resuscitation; notified Dr. Langley of infection, fetal tachy, and plan           4.  Labor progress: active second stage           5.  Plan: Proceed toward delivery    Berenice Peres CNM  Attending MD: Dr. Langley  "

## 2023-03-25 ENCOUNTER — PHARMACY VISIT (OUTPATIENT)
Dept: PHARMACY | Facility: MEDICAL CENTER | Age: 28
End: 2023-03-25
Payer: COMMERCIAL

## 2023-03-25 VITALS
HEART RATE: 92 BPM | OXYGEN SATURATION: 97 % | HEIGHT: 62 IN | TEMPERATURE: 97.3 F | RESPIRATION RATE: 16 BRPM | BODY MASS INDEX: 30.36 KG/M2 | WEIGHT: 165 LBS | DIASTOLIC BLOOD PRESSURE: 70 MMHG | SYSTOLIC BLOOD PRESSURE: 103 MMHG

## 2023-03-25 PROCEDURE — 700105 HCHG RX REV CODE 258

## 2023-03-25 PROCEDURE — A9270 NON-COVERED ITEM OR SERVICE: HCPCS

## 2023-03-25 PROCEDURE — 700102 HCHG RX REV CODE 250 W/ 637 OVERRIDE(OP)

## 2023-03-25 PROCEDURE — RXMED WILLOW AMBULATORY MEDICATION CHARGE

## 2023-03-25 PROCEDURE — 700111 HCHG RX REV CODE 636 W/ 250 OVERRIDE (IP)

## 2023-03-25 RX ORDER — ACETAMINOPHEN 500 MG
1000 TABLET ORAL EVERY 4 HOURS PRN
Qty: 30 TABLET | Refills: 0 | Status: SHIPPED | OUTPATIENT
Start: 2023-03-25

## 2023-03-25 RX ORDER — PSEUDOEPHEDRINE HCL 30 MG
100 TABLET ORAL 2 TIMES DAILY PRN
Qty: 60 CAPSULE | Refills: 0 | Status: SHIPPED | OUTPATIENT
Start: 2023-03-25

## 2023-03-25 RX ORDER — IBUPROFEN 800 MG/1
800 TABLET ORAL EVERY 8 HOURS PRN
Qty: 30 TABLET | Refills: 0 | Status: SHIPPED | OUTPATIENT
Start: 2023-03-25

## 2023-03-25 RX ADMIN — GENTAMICIN SULFATE 310 MG: 40 INJECTION, SOLUTION INTRAMUSCULAR; INTRAVENOUS at 02:57

## 2023-03-25 RX ADMIN — AMPICILLIN SODIUM 2000 MG: 2 INJECTION, POWDER, FOR SOLUTION INTRAVENOUS at 01:47

## 2023-03-25 RX ADMIN — PRENATAL WITH FERROUS FUM AND FOLIC ACID 1 TABLET: 3080; 920; 120; 400; 22; 1.84; 3; 20; 10; 1; 12; 200; 27; 25; 2 TABLET ORAL at 08:41

## 2023-03-25 ASSESSMENT — EDINBURGH POSTNATAL DEPRESSION SCALE (EPDS)
I HAVE FELT SAD OR MISERABLE: NO, NOT AT ALL
I HAVE BLAMED MYSELF UNNECESSARILY WHEN THINGS WENT WRONG: NO, NEVER
I HAVE BEEN ABLE TO LAUGH AND SEE THE FUNNY SIDE OF THINGS: AS MUCH AS I ALWAYS COULD
I HAVE BEEN SO UNHAPPY THAT I HAVE BEEN CRYING: NO, NEVER
I HAVE BEEN ANXIOUS OR WORRIED FOR NO GOOD REASON: NO, NOT AT ALL
I HAVE LOOKED FORWARD WITH ENJOYMENT TO THINGS: AS MUCH AS I EVER DID
THE THOUGHT OF HARMING MYSELF HAS OCCURRED TO ME: NEVER
I HAVE FELT SCARED OR PANICKY FOR NO GOOD REASON: NO, NOT AT ALL
I HAVE BEEN SO UNHAPPY THAT I HAVE HAD DIFFICULTY SLEEPING: NOT AT ALL
THINGS HAVE BEEN GETTING ON TOP OF ME: NO, I HAVE BEEN COPING AS WELL AS EVER

## 2023-03-25 ASSESSMENT — PAIN DESCRIPTION - PAIN TYPE: TYPE: ACUTE PAIN

## 2023-03-25 NOTE — PROGRESS NOTES
This RN was advised by Midwife ok to d/c cueva.  Cueva removed pt tolerated well.  Pt and fob informed will need to void within 6 hours.  Parents verbalized understanding.

## 2023-03-25 NOTE — DISCHARGE INSTRUCTIONS
DISCHARGE INSTRUCTIONS (Turkish) POSTPARTUM FOR MOM      ISIS LAS KAMALJIT:  Antes de tocar el bebé.  Antes del amamantamiento o darle al bebé lactancia artificial.  Después de usar el baño.    CUIDADO DE LAS HERIDAS:  La Incisión de la Operación Cesárea:  Mantenga limpea y seca, NO levante nada que pesa más que lopez bebé por 6 semenas.  No debe ninguna apertura ni pus.  Episiotomía/Benji Vaginal:  Use un spray de Tucks o Dermoplast, tome un baño de asiento cuando necesite (6 pulgadas), siga usando la botella Keren hasta que pare de sangrar.    CUIDADA DEL SENO:  Lleve un sostén.  Si esta` amamantando no use jabón en el seno ni en los pezones.  Aplique lanolina si los pezones se ponen quebrazados y si le duelen.    CUIDADO DE LA VAGINA:  [place] puede entrar la vagina por 6 semanas:  Actividad sexual, Ducha vaginal, Tampones.  El sangramiento puede seguir por 2 a 4 semanas.  La cantidad es variable.  Llame a lopez med`ico(a) obstetra si hay mucha brenda (si usa ma`s de claudia toalla femenina cada hora).    CONTRACEPCIÒN:  Es posible embarazarse en cualquier tiempo despus del parto y mientras el amamantamiento.  Debe planear de discutir los metodos de cantracepción con lopez médico(a) cuando vuelva en 6 semanas para lopez revisión médica.    DIETA Y DEFECACÒN:  Para evitar la constipación coma mas fibra (cereal salvado, fruta, y verduras) Y tome muchos liquidos.   Es común orinar frecuentemente después del parto.    POSTPARTO Y LA DEPRESÒN:  Deepika los primeros hurt después del parto es común sentirse:  Impaciente, irritable, o llorar  Estos sentimientos llegan y van rapidamente.  No obstante, winnie claudia de cada hortencia mujeres tiene síntomas emocionales conocidos renetta depresión postparto.  Despresión Postparto:  Puede empezar tan pronto renetta el alec o tercer día después del parto o puede durar algunas semanas o meses para desarrollar.  Síntomas de la depresión pueden presentarse, candice son más intensos:  Pérdida del hambre  Frecuencia  de llorar  Sentirse desesperada o perder el control  Demasiada o no suficiente preocupación con lopez bebé  Tener miedo de tocar el bebé  No preocuparse con lopez propia apariencia  Incapacidad de dormir o dormir excesivamente    DEPRESIÒN / RIESGO AL SUICIDIO:    Cuando se le da de rasheeda de alguna entidad de [facility], es importante aprender a mantener a dinora de hacerse daño a sí mismo.    Reconocer los signos de advertencia:  Cambios bruscos en la peronalidad, positiva o negativa, incluyendo aumento de la energia  Regalar posesiones  Cambios en patrones de comer - significativos cambios de peso - positivos o negativos  Cambio de patrones para dormir - no poder dormir o dormir todo el tiempo  Falta de voluntad o incapacidad de comunicarse  Depresión  Carolyn, desánimo y tristeza inusual  Habla de querer morir  Descuido del aspecto personal  Rebeldía - comportamiento imprudente  Retiro de personas y actividades que les gusta  Confusión-incapacidad para concentrarse    Si usted o un ser querido observa cualquiera de estos comportamientos o tiene preocupaciones de hacerse daño a si mismo, aquí le damos lo que usted puede hacer:  Hablar de ti - tus sentimientos y razones para hacerse shawn a si mismo  Retire cualquier medio que se podría utilizar para lastimarse (ejemplos: píldoras, cuerdas, cordones de extensión, arma de nydia)  Obtenga ayuda profesional de la comunidad (verónica mental, abuso de sustancias, orientación psicológica)  No estar solo: llamar a un contacto seguro - claudia persona que confía en que estará allí por usted  Llamada local L´INEA de CRISIS [phone] y [phone]  Llamada [facility] [employer] Para Crisis de [place] [phone] or [email or web address]  Llamada gratuita nacional, líneas de prevención del suicidio  Preventión del Suicidio Nacional [facility] [phone] ([phone])  Línea Nacional de la [place] [phone])       ([employer]  Links)        PATIENT DISCHARGE EDUCATION INSTRUCTION SHEET    REASONS TO CALL YOUR OBSTETRICIAN  Persistent fever, shaking, chills (Temperature higher than 100.4) may indicate you have an infection  Heavy bleeding: soaking more than 1 pad per hour; Passing clots an egg-sized clot or bigger may mean you have an postpartum hemorrhage  Foul odor from vagina or bad smelling or discolored discharge or blood  Breast infection (Mastitis symptoms); breast pain, chills, fever, redness or red streaks, may feel flu like symptoms  Urinary pain, burning or frequency  Incision that is not healing, increased redness, swelling, tenderness or pain, or any pus from episiotomy or  site may mean you have an infection  Redness, swelling, warmth, or painful to touch in the calf area of your leg may mean you have a blood clot  Severe or intensified depression, thoughts or feelings of wanting to hurt yourself or someone else   Pain in chest, obstructed breathing or shortness of breath (trouble catching your breath) may mean you are having a postpartum complication. Call your provider immediately   Headache that does not get better, even after taking medicine, a bad headache with vision changes or pain in the upper right area of your belly may mean you have high blood pressure or post birth preeclampsia. Call your provider immediately    HAND WASHING  All family and friends should wash their hands:  Before and after holding the baby  Before feeding the baby  After using the restroom or changing the baby's diaper    WOUND CARE  Ask your physician for additional care instructions. In general:   Incision:  May shower and pat incision dry   Keep the incision clean and dry  There should not be any opening or pus from the incision  Continue to walk at home 3 times a day   Do NOT lift anything heavier than your baby (over 10 pounds)  Encourage family to help participate in care of the  to allow rest and mom time to  heal  Episiotomy/Laceration  May use danelle-spray bottle, witch hazel pads and dermaplast spray for comfort  Use danelle-spray bottle after urinating to cleanse perineal area  To prevent burning during urination spray danelle-water bottle on labial area   Pat perineal area dry until episiotomy/laceration is healed  Continue to use danelle-bottle until bleeding stops as needed  If have a 2nd degree laceration or greater, a Sitz bath can offer relief from soreness, burning, and inflammation   Sitz Bath   Sit in 6 inches of warm water and soak laceration as needed until the laceration heals    VAGINAL CARE AND BLEEDING  Nothing inside vagina for 6 weeks:   No sexual intercourse, tampons or douching  Bleeding may continue for 2-4 weeks. Amount and color may vary  Soaking 1 pad or more in an hour for several hours is considered heavy bleeding  Passing large egg sized blood clots can be concerning  If you feel like you have heavy bleeding or are having increasing amount of blood clots call your Obstetrician immediately  If you begin feeling faint upon standing, feeling sick to your stomach, have clammy skin, a really fast heartbeat, have chills, start feeling confused, dizzy, sleepy or weak, or feeling like you're going to faint call your Obstetrician immediately    HYPERTENSION   Preeclampsia or gestational hypertension are types of high blood pressure that only pregnant women can get. It is important for you to be aware of symptoms to seek early intervention and treatment. If you have any of these symptoms immediately call your Obstetrician    Vision changes or blurred vision   Severe headache or pain that is unrelieved with medication and will not go away  Persistent pain in upper abdomen or shoulder   Increased swelling of face, feet, or hands  Difficulty breathing or shortness of breath at rest  Urinating less than usual    URINATION AND BOWEL MOVEMENTS  Eating more fiber (bran cereal, fruits, and vegetables) and drinking  "plenty of fluids will help to avoid constipation  Urinary frequency and urgency after childbirth is normal  If you experience any urinary pain, burning or frequency call your provider    BIRTH CONTROL  It is possible to become pregnant at any time after delivery and while breastfeeding  Plan to discuss a method of birth control with your physician at your post delivery follow up visit    POSTPARTUM BLUES  During the first few days after birth, you may experience a sense of the \"blues\" which may include impatience, irritability or even crying. These feelings come and go quickly. However, as many as 1 in 10 women experience emotional symptoms known as postpartum depression.     POSTPARTUM DEPRESSION    May start as early as the second or third day after delivery or take several weeks or months to develop. Symptoms of \"blues\" are present, but are more intense: Crying spells; loss of appetite; feelings of hopelessness or loss of control; fear of touching the baby; over concern or no concern at all about the baby; little or no concern about your own appearance/caring for yourself; and/or inability to sleep or excessive sleeping. Contact your Obstetrician if you are experiencing any of these symptoms     PREVENTING SHAKEN BABY  If you are angry or stressed, PUT THE BABY IN THE CRIB, step away, take some deep breaths, and wait until you are calm to care for the baby. DO NOT SHAKE THE BABY. You are not alone, call a supporter for help.  [facility] 24/7 crisis call line ([phone]) or (1-835.794.7013)  You can also text them, text \"ANSWER\" (091616)  "

## 2023-03-25 NOTE — PROGRESS NOTES
Discharge paperwork and handouts provided to pt. All questions answered and all belongings returned. Car seat check completed, pt safely and correctly placed infant in car seat. ID bands verified with infants, pt and infant escorted to car.

## 2023-03-25 NOTE — CARE PLAN
Problem: Psychosocial - Postpartum  Goal: Patient will verbalize and demonstrate effective bonding and parenting behavior  Outcome: Progressing   The patient is Stable - Low risk of patient condition declining or worsening    Shift Goals  Clinical Goals: Lochia WDL  Patient Goals: Healthy mom healthy baby  Family Goals: Adequate support of pt and baby    Progress made toward(s) clinical / shift goals:  Pt demonstrates effective bonding with infant.  FOB at bedside as well and very active in care    Patient is not progressing towards the following goals:

## 2023-03-25 NOTE — PROGRESS NOTES
Assumed patient care. Pt is resting comfortably with baby in crib. Pt declines pain. Patient assessment completed. Discussed infant bulb suction and emergency call light. POC reviewed with patient all questions answered. Will continue to monitor, call light in reach.

## 2023-03-25 NOTE — DISCHARGE SUMMARY
Discharge Summary:      Poonam Turpin    Admit Date:   3/23/2023  Discharge Date:  3/25/2023     Admitting diagnosis:  Indication for care in labor or delivery [O75.9]  Discharge Diagnosis: Status post vaginal, spontaneous. 3rd degree laceration.   Pregnancy Complications: none. Labor was complicated by intraamniotic infection.   Tubal Ligation:  no        History:  History reviewed. No pertinent past medical history.  OB History    Para Term  AB Living   1 1 1     1   SAB IAB Ectopic Molar Multiple Live Births           0 1      # Outcome Date GA Lbr Jason/2nd Weight Sex Delivery Anes PTL Lv   1 Term 23 40w1d / 02:39 3.455 kg (7 lb 9.9 oz) F Vag-Spont Local N JOCELIN      Complications: Chorioamnionitis        Patient has no known allergies.  Patient Active Problem List    Diagnosis Date Noted    Perineal laceration with delivery, third degree, delivered 2023    Indication for care in labor or delivery 2023    Supervision of first pregnancy  2022        Hospital Course:   28 y.o. , now para 1, was admitted with the above mentioned diagnosis, underwent Active Labor, delivery was vaginal, spontaneous. Prior to delivery she was diagnosed with presumed IAI based on fever and fetal tachycardia - IV ampicillin and gentamycin were started and continued for 24hours without fever. She sustained a 3rd degree laceration which was repaired and packing was placed on 3/24 and subsequently removed after 4 hours with excellent hemostasis. Patient postpartum course was unremarkable, with progressive advancement in diet , ambulation and toleration of oral analgesia. Patient without complaints today and desires discharge.  We discussed the need for removal of cueva catheter and ability to ambulate independently and void effectively prior to discharge. She agrees and desires a PM discharge if she is able to meet these conditions.     Vitals:    23 1718 23 2200  03/25/23 0200 03/25/23 0555   BP: 109/74 108/76 112/72 99/64   Pulse: (!) 117 88 98 96   Resp: 16 17 17 17   Temp: 36.5 °C (97.7 °F) 36.7 °C (98 °F) 36.8 °C (98.2 °F) 36.7 °C (98 °F)   TempSrc: Temporal Temporal Temporal Temporal   SpO2: 96% 96% 96% 96%   Weight:       Height:           Current Facility-Administered Medications   Medication Dose    acetaminophen (TYLENOL) tablet 1,000 mg  1,000 mg    ampicillin (Omnipen) 2,000 mg in  mL IVPB  2,000 mg    lactated ringers infusion      docusate sodium (COLACE) capsule 100 mg  100 mg    ibuprofen (MOTRIN) tablet 800 mg  800 mg    acetaminophen (TYLENOL) tablet 1,000 mg  1,000 mg    tetanus-dipth-acell pertussis (Tdap) inj 0.5 mL  0.5 mL    PRN oxytocin (PITOCIN) (20 Units/1000 mL) PRN for excessive uterine bleeding - See Admin Instr  125-999 mL/hr    miSOPROStol (CYTOTEC) tablet 600 mcg  600 mcg    methylergonovine (METHERGINE) injection 0.2 mg  0.2 mg    NIFEdipine IR (PROCARDIA) capsule 10 mg  10 mg    bisacodyl (DULCOLAX) suppository 10 mg  10 mg    magnesium hydroxide (MILK OF MAGNESIA) suspension 30 mL  30 mL    oxyCODONE immediate-release (ROXICODONE) tablet 5 mg  5 mg    prenatal plus vitamin (STUARTNATAL 1+1) 27-1 MG tablet 1 Tablet  1 Tablet    simethicone (Mylicon) chewable tablet 125 mg  125 mg    calcium carbonate (Tums) chewable tab 1,000 mg  1,000 mg    LR infusion      oxytocin (PITOCIN) infusion (for post delivery)  125 mL/hr       Exam:  Breast Exam: negative  Abdomen: Abdomen soft, non-tender. BS normal. No masses,  No organomegaly  Fundus Non Tender: yes  Incision: none  Perineum: 3rd degree laceration repair intact, right vaginal wall hematoma stable without growth over the last 24 hours, swelling is mild  Extremity: extremities, peripheral pulses and reflexes normal     Labs:  Recent Labs     03/23/23 2010 03/24/23  1056 03/24/23  1908   WBC 14.6* 20.0* 19.5*   RBC 4.12* 3.30* 3.40*   HEMOGLOBIN 11.5* 9.0* 9.4*   HEMATOCRIT 34.6* 28.4*  28.8*   MCV 84.0 86.1 84.7   MCH 27.9 27.3 27.6   MCHC 33.2* 31.7* 32.6*   RDW 47.7 48.7 47.8   PLATELETCT 247 249 260   MPV 10.0 10.3 10.3        Activity:   Discharge to home  Pelvic Rest x 6 weeks    Assessment:  normal postpartum course  Undecided on BCM  Discharge Assessment: Taking adequate diet and fluids, no heavy bleeding or foul discharge, breasts without evidence of infection/concerns, voiding without difficulty     Follow up: [facility] in 5 weeks for postpartum exam     Reviewed with Poonam the need to call or go to ED for any of the following:  - Fever over 100.5  - Severe abdominal pain  - Severe pain or swelling of laceration or incinsion site  - Red streaks or painful masses in the breasts  - Foul smelling discharge or lochia  - Heavy vaginal bleeding saturating a pad per hour or passing golf ball sized clots  - Sxs of PP depression  - Severe unremitting headache or visual changes     Discharge Meds:   Current Outpatient Medications   Medication Sig Dispense Refill    acetaminophen (TYLENOL) 500 MG Tab Take 2 Tablets by mouth every four hours as needed for Fever. 30 Tablet 0    docusate sodium 100 MG Cap Take 100 mg by mouth 2 times a day as needed for Constipation. 60 Capsule 0    ibuprofen (MOTRIN) 800 MG Tab Take 1 Tablet by mouth every 8 hours as needed for Mild Pain or Moderate Pain. 30 Tablet 0       Berenice Peres C.N.M.  Attending MD: Dr. Kelly

## 2023-04-23 ASSESSMENT — EDINBURGH POSTNATAL DEPRESSION SCALE (EPDS)
THINGS HAVE BEEN GETTING ON TOP OF ME: NO, MOST OF THE TIME I HAVE COPED QUITE WELL
I HAVE BEEN SO UNHAPPY THAT I HAVE HAD DIFFICULTY SLEEPING: NOT VERY OFTEN
I HAVE FELT SAD OR MISERABLE: NO, NOT AT ALL
THE THOUGHT OF HARMING MYSELF HAS OCCURRED TO ME: NEVER
THE THOUGHT OF HARMING MYSELF HAS OCCURRED TO ME: NEVER
I HAVE BEEN SO UNHAPPY THAT I HAVE HAD DIFFICULTY SLEEPING: NOT VERY OFTEN
I HAVE BEEN SO UNHAPPY THAT I HAVE BEEN CRYING: NO, NEVER
I HAVE FELT SAD OR MISERABLE: NO, NOT AT ALL
I HAVE BEEN SO UNHAPPY THAT I HAVE BEEN CRYING: NO, NEVER
THINGS HAVE BEEN GETTING ON TOP OF ME: NO, MOST OF THE TIME I HAVE COPED QUITE WELL

## 2023-04-26 ENCOUNTER — POST PARTUM (OUTPATIENT)
Dept: OBGYN | Facility: CLINIC | Age: 28
End: 2023-04-26
Payer: MEDICAID

## 2023-04-26 VITALS — SYSTOLIC BLOOD PRESSURE: 118 MMHG | DIASTOLIC BLOOD PRESSURE: 72 MMHG | WEIGHT: 142.6 LBS | BODY MASS INDEX: 26.08 KG/M2

## 2023-04-26 DIAGNOSIS — Z30.09 ENCOUNTER FOR COUNSELING REGARDING CONTRACEPTION: ICD-10-CM

## 2023-04-26 PROBLEM — Z34.00 PREGNANCY, SUPERVISION OF FIRST: Status: RESOLVED | Noted: 2022-09-27 | Resolved: 2023-04-26

## 2023-04-26 PROCEDURE — 0503F POSTPARTUM CARE VISIT: CPT

## 2023-04-26 RX ORDER — NORELGESTROMIN AND ETHINYL ESTRADIOL 35; 150 UG/MG; UG/MG
1 PATCH TRANSDERMAL
Qty: 12 PATCH | Refills: 3 | Status: SHIPPED | OUTPATIENT
Start: 2023-04-26

## 2023-04-26 ASSESSMENT — EDINBURGH POSTNATAL DEPRESSION SCALE (EPDS)
I HAVE LOOKED FORWARD WITH ENJOYMENT TO THINGS: AS MUCH AS I EVER DID
I HAVE FELT SAD OR MISERABLE: NO, NOT AT ALL
I HAVE BEEN SO UNHAPPY THAT I HAVE BEEN CRYING: NO, NEVER
THINGS HAVE BEEN GETTING ON TOP OF ME: NO, I HAVE BEEN COPING AS WELL AS EVER
I HAVE BEEN SO UNHAPPY THAT I HAVE HAD DIFFICULTY SLEEPING: NOT AT ALL
THE THOUGHT OF HARMING MYSELF HAS OCCURRED TO ME: NEVER
I HAVE BEEN ANXIOUS OR WORRIED FOR NO GOOD REASON: NO, NOT AT ALL
I HAVE BLAMED MYSELF UNNECESSARILY WHEN THINGS WENT WRONG: NO, NEVER
I HAVE FELT SCARED OR PANICKY FOR NO GOOD REASON: NO, NOT AT ALL
TOTAL SCORE: 0
I HAVE BEEN ABLE TO LAUGH AND SEE THE FUNNY SIDE OF THINGS: AS MUCH AS I ALWAYS COULD

## 2023-04-26 NOTE — PROGRESS NOTES
Subjective   Subjective:    Poonam Turpin is a 28 y.o.  female who presents for her postpartum exam. She had a complicated vaginal birth that resulted in a 3rd degree laceration. Her pregnancy was uncomplicated. Eating a regular diet without difficulty. Bowel movement are Normal.  Pain is controlled without any medications - had been taking ibuprofen for vaginal pain and cramping but felt that she had more bleeding with is so is no longer taking any analgesics. Vaginal bleeding: had mostly resolved to light pink discharge until she started her menses today.  She denies concerns with her laceration, mostly feels comfortable. Has not initiated intercourse yet. Nervous about pap due to menses today so declines.   She is breast feeding, no concerns. She desires [facility] patch for her birth control method. We reviewed other progesterone only options but she prefers to wait until at least 42 days PP to initiate contraception and prefers the patch - she is aware it can affect the volume of breast milk. Denies any S/S of PP depression.    Objective   Objective:      Objective : The patient appears well, alert and oriented x 3, in no acute distress.  /72   Wt 142 lb 9.6 oz     HEENT Exam: EOMI, pupils equal and round, no adenopathy or thyromegaly.  Lungs: Clear to Auscultation   Cardiac: S1 and S2 normal, no murmurs, or rubs   Abdomen: Soft without tenderness, guarding mass or organomegaly. No diastasis  Extremities: No edema, pulses equal  Neurological: Normal, No focal signs  Breast Exam: deferred, no complaints  Pelvic: External genitalia,urethral meatus, urethra normal. Site of perineal laceration appears well approximated without separation, erythema, exudate. Single finger was inserted to assess the integrity of the posterior vaginal wall and it was intact without evidence of fistula or rectovaginal prolapse. Patient did wince with pain on exam and reported moderate discomfort with exam.    Pap: Up to date but records not available, recommend repeat. Pt declines today.       Assessment   Assessment:    1. PP care of lactating woman   2. Exam WNL   3. Pap WNL in 2021 per patient, records not available, recommend repeat  4. Desires contraception: ortho evra patch, will wait until at least 42 days PP to start contraception due to estrogen content.   5. EPDS score: 0    Patient Active Problem List    Diagnosis Date Noted    Perineal laceration with delivery, third degree, delivered 03/25/2023    Indication for care in labor or delivery 03/23/2023    Supervision of first pregnancy  09/27/2022       Plan   Plan:    1. Breastfeeding support   2. Continue PNV   3. Contraceptive counseling - after discussion of LARC methods, POPs, and condoms, the patient prefers to wait to initiate contraception until 42 days PP. Rx for patch sent to preferred pharmacy after discussion of R/B/A.   4. Encouraged condom use for STI and pregnancy prevention  5. Discussed diet, exercise and resumption of sexual activity. We discussed pelvic pain and normal expectations for resumption of intercourse. She will RTC if intercourse is painful or she has other concerns with the healing of her 3rd degree laceration.   6. Recommended pap update when patient desires, up to date per report but would be preferred to have a result due to no records.   7.  F/u c PCP or [facility] as needed for primary care needs.     Berenice Peres C.N.M.

## 2023-04-26 NOTE — PROGRESS NOTES
Pt here today for postpartum exam.  Delivery Date: 03/24/2023, had vaginal delivery  Breast feeding only  BCM: patch or other options  Last pap: 04/2022 Negative per pt. No records available.   LMP: today 04/26/2023  Pt states she has been getting occasional headaches since after delivery and a a ringing sound on both ears   Pt states her vagina stiches has healed well.   WT: 142.6 lb  BP: 118/72  Good ph: [phone]    EPDS Score: 0

## 2023-05-11 ENCOUNTER — GYNECOLOGY VISIT (OUTPATIENT)
Dept: OBGYN | Facility: CLINIC | Age: 28
End: 2023-05-11

## 2023-05-11 VITALS
DIASTOLIC BLOOD PRESSURE: 62 MMHG | WEIGHT: 140.8 LBS | HEIGHT: 61 IN | BODY MASS INDEX: 26.58 KG/M2 | SYSTOLIC BLOOD PRESSURE: 102 MMHG

## 2023-05-11 DIAGNOSIS — S31.41XD: ICD-10-CM

## 2023-05-11 PROCEDURE — 99211 OFF/OP EST MAY X REQ PHY/QHP: CPT

## 2023-05-11 PROCEDURE — 3078F DIAST BP <80 MM HG: CPT

## 2023-05-11 PROCEDURE — 3074F SYST BP LT 130 MM HG: CPT

## 2023-05-11 NOTE — PROGRESS NOTES
GYN follow up for laceration   Pt had a vaginal delivery on 03/24/2023 (had 3rd degree laceration)  LMP: not yet since delivery  BC: unsure yet   Breastfeeding only  WT: 140.8 lb  BP: 102/62  Pt states she is still having perineum pain that comes and goes sometimes lasting all day long and bleeding accompanied. States no other complaints   Good # [phone]

## 2023-05-11 NOTE — PROGRESS NOTES
"S: Poonam here with  and daughter for follow up laceration check. Some pain still with laceration. Not constant, but often. She reports some \"pulsating\" pain that is strong. No LMP since delivery but occasionally has some bleeding on the toilet paper when she wipes. No intercourse yet. No issues with passing stool, no incontinence of bladder or bowel. Pain is worse with increased activity and better with rest but sometimes will be painful at random times not associated with activity.     O:   The patient appears well, alert and oriented x 3, in no acute distress.  Vitals:    05/11/23 1513   BP: 102/62   Weight: 140 lb 12.8 oz   Height: 5' 1.42\"     Pelvic: External genitalia,urethral meatus, urethra, and vagina normal. Perineum with mild erythema but no breakdown of perineal repair. Patient has a very hard time with exam, very tender. Anus and perineum normal. Rectovaginal without masses or fistula present. Digital rectal exam negative for rectovaginal fistula or evidence of capsular compromise.  Pap: Declines today    A/P:  Encounter Diagnoses   Name Primary?    Perineal laceration with delivery, third degree, delivered     Perineal laceration of perineal muscles, subsequent encounter      3rd degree perineal laceration, subsequent visit: PT referral for pelvic floor PT. Will request recommendation for pelvic floor PT and pass on to patient when available. Reviewed rafat with Dr. Mendoza who agrees with plan for PT.   RTC after PT treatments if ongoing pain or concerns.     Berenice Peres C.N.M.  "

## 2023-05-15 ENCOUNTER — TELEPHONE (OUTPATIENT)
Dept: OBGYN | Facility: CLINIC | Age: 28
End: 2023-05-15
Payer: COMMERCIAL

## 2023-05-15 NOTE — TELEPHONE ENCOUNTER
Late Entry 05/12/2023 ~ 1120 I contacted pt Per Berenice Peres CNM. I spoke with pt and provided information/sources where she can call for pelvic floor therapy.    Kirti SPARKS will also be contacting pt. to provide information about [facility] Pelvic Physical therapists:     [facility]:   Audrey Yang DPT and Rhonda Holden DPT   [address], [place] [address]   [phone]   [email or web address]     [facility]:   Diya Osullivan DPT   [address], [address], [place], [place] [address]   Phone: [phone]   [email or web address]     [facility]:   Elena Dumas DPT   [address] [address]   Phone: [phone]   [email or web address]     [facility] Ron DPT   [address]     Phone: [phone]   [email or web address]     [facility]   Airam Vasquez MPT   [address], [place] [address]   Phone: [phone]   [email or web address]     [facility]   Lea Wiseman MSPT   [address], [place], [address]   Phone: [phone]   [email or web address]

## 2024-01-09 ENCOUNTER — APPOINTMENT (OUTPATIENT)
Dept: OBGYN | Facility: CLINIC | Age: 29
End: 2024-01-09

## 2024-01-09 ENCOUNTER — APPOINTMENT (OUTPATIENT)
Dept: RADIOLOGY | Facility: IMAGING CENTER | Age: 29
End: 2024-01-09

## 2024-01-09 DIAGNOSIS — N91.2 AMENORRHEA: ICD-10-CM

## 2024-01-09 PROCEDURE — 76801 OB US < 14 WKS SINGLE FETUS: CPT | Mod: TC | Performed by: ADVANCED PRACTICE MIDWIFE

## 2024-02-02 ENCOUNTER — APPOINTMENT (OUTPATIENT)
Dept: OBGYN | Facility: CLINIC | Age: 29
End: 2024-02-02
Payer: COMMERCIAL

## 2024-02-07 ENCOUNTER — INITIAL PRENATAL (OUTPATIENT)
Dept: OBGYN | Facility: CLINIC | Age: 29
End: 2024-02-07

## 2024-02-07 ENCOUNTER — HOSPITAL ENCOUNTER (OUTPATIENT)
Facility: MEDICAL CENTER | Age: 29
End: 2024-02-07
Attending: NURSE PRACTITIONER
Payer: COMMERCIAL

## 2024-02-07 VITALS — SYSTOLIC BLOOD PRESSURE: 98 MMHG | WEIGHT: 140.6 LBS | DIASTOLIC BLOOD PRESSURE: 60 MMHG | BODY MASS INDEX: 26.21 KG/M2

## 2024-02-07 DIAGNOSIS — Z34.82 ENCOUNTER FOR SUPERVISION OF OTHER NORMAL PREGNANCY, SECOND TRIMESTER: ICD-10-CM

## 2024-02-07 DIAGNOSIS — Z34.82 ENCOUNTER FOR SUPERVISION OF OTHER NORMAL PREGNANCY, SECOND TRIMESTER: Primary | ICD-10-CM

## 2024-02-07 DIAGNOSIS — O09.899 SHORT INTERVAL BETWEEN PREGNANCIES AFFECTING PREGNANCY, ANTEPARTUM: ICD-10-CM

## 2024-02-07 PROBLEM — Z87.59 HISTORY OF THIRD DEGREE PERINEAL LACERATION: Status: ACTIVE | Noted: 2023-03-25

## 2024-02-07 PROCEDURE — 3074F SYST BP LT 130 MM HG: CPT | Performed by: NURSE PRACTITIONER

## 2024-02-07 PROCEDURE — 0501F PRENATAL FLOW SHEET: CPT | Performed by: NURSE PRACTITIONER

## 2024-02-07 PROCEDURE — 3078F DIAST BP <80 MM HG: CPT | Performed by: NURSE PRACTITIONER

## 2024-02-07 ASSESSMENT — EDINBURGH POSTNATAL DEPRESSION SCALE (EPDS)
I HAVE BEEN SO UNHAPPY THAT I HAVE BEEN CRYING: NO, NEVER
I HAVE BEEN SO UNHAPPY THAT I HAVE HAD DIFFICULTY SLEEPING: NOT AT ALL
THE THOUGHT OF HARMING MYSELF HAS OCCURRED TO ME: NEVER
I HAVE BLAMED MYSELF UNNECESSARILY WHEN THINGS WENT WRONG: NO, NEVER
I HAVE LOOKED FORWARD WITH ENJOYMENT TO THINGS: AS MUCH AS I EVER DID
TOTAL SCORE: 0
I HAVE BEEN ANXIOUS OR WORRIED FOR NO GOOD REASON: NO, NOT AT ALL
I HAVE BEEN ABLE TO LAUGH AND SEE THE FUNNY SIDE OF THINGS: AS MUCH AS I ALWAYS COULD
THINGS HAVE BEEN GETTING ON TOP OF ME: NO, I HAVE BEEN COPING AS WELL AS EVER
I HAVE FELT SCARED OR PANICKY FOR NO GOOD REASON: NO, NOT AT ALL
I HAVE FELT SAD OR MISERABLE: NO, NOT AT ALL

## 2024-02-07 ASSESSMENT — ENCOUNTER SYMPTOMS
PSYCHIATRIC NEGATIVE: 1
MUSCULOSKELETAL NEGATIVE: 1
CONSTITUTIONAL NEGATIVE: 1
CARDIOVASCULAR NEGATIVE: 1
RESPIRATORY NEGATIVE: 1
GASTROINTESTINAL NEGATIVE: 1
NEUROLOGICAL NEGATIVE: 1
EYES NEGATIVE: 1

## 2024-02-07 NOTE — PROGRESS NOTES
S:  Poonam Turpin is a 29 y.o.  who presents for her new OB exam.  She is 13w6d with and ERICK of Estimated Date of Delivery: 24 based off of LMP, which was c/w US . She has no complaints.  She is currently not working. Discussed heavy lifting and chemical exposure. No ER visits or previous care in this pregnancy.     Declines NIPT and AFP, will review again as needed.  Declines CF.  Denies VB, LOF, or cramping.  Denies dysuria, vaginal DC. Reports no fetal movement.     Pt is  and lives with  and child.  Pregnancy is planned and desired.    She has a history of 1 full term . Denies pregnancy complications. Delivery complicated by 3rd degree perineal laceration, but states everything healed well.      She is still breastfeeding her child, but denies concerns. Education provided.    Discussed diet and exercise during pregnancy. Encouraged good nutrition, and daily exercise including walking or swimming. Discussed expected weight gain during pregnancy. Discussed adequate hydration during pregnancy.    History reviewed. No pertinent past medical history.  Family History   Problem Relation Age of Onset    No Known Problems Mother     No Known Problems Father     No Known Problems Sister     No Known Problems Brother     No Known Problems Maternal Grandmother     No Known Problems Maternal Grandfather     No Known Problems Paternal Grandmother     No Known Problems Paternal Grandfather      Social History     Socioeconomic History    Marital status:      Spouse name: Not on file    Number of children: Not on file    Years of education: Not on file    Highest education level: Not on file   Occupational History    Not on file   Tobacco Use    Smoking status: Never    Smokeless tobacco: Never   Vaping Use    Vaping Use: Never used   Substance and Sexual Activity    Alcohol use: Not Currently    Drug use: Never    Sexual activity: Yes     Partners: Male     Comment: none    Other Topics Concern    Not on file   Social History Narrative    Not on file     Social Determinants of Health     Financial Resource Strain: Not on file   Food Insecurity: Not on file   Transportation Needs: Not on file   Physical Activity: Not on file   Stress: Not on file   Social Connections: Not on file   Intimate Partner Violence: Not on file   Housing Stability: Not on file     OB History    Para Term  AB Living   2 1 1     1   SAB IAB Ectopic Molar Multiple Live Births           0 1      # Outcome Date GA Lbr Jason/2nd Weight Sex Delivery Anes PTL Lv   2 Current            1 Term 23 40w1d / 02:39 7 lb 9.9 oz F Vag-Spont Local N JOCELIN      Complications: Chorioamnionitis       History of Varicella Virus: no  History of HSV I or II in self or partner: no  History of Thyroid problems: no    O:  BP 98/60   Wt 140 lb 9.6 oz    See Prenatal Physical.    Wet mount: deferred, no s/sx  Chaperone offered: yes    A:   1.  IUP @ 13w6d per LMP, c/w US        2.  S=D        3.  See problem list below        4.  Closely spaced pregnancy- Last baby born 2023       Patient Active Problem List    Diagnosis Date Noted    Encounter for supervision of other normal pregnancy, second trimester 2024    Short interval between pregnancies affecting pregnancy, antepartum 2024    History of third degree perineal laceration 2023         P:  1.  GC/CT & vaginal pathogens done, states pap done last year and normal        2.  Prenatal labs ordered - lab slip given        3.  Discussed PNV, diet, avoidances and adequate water intake        4.  NOB packet given        5.  Return to office in 4 wks        6.  Complete OB US in 3 wks        7.  Declines AFP and NIPT    Orders Placed This Encounter    US-OB 2ND 3RD TRI COMPLETE    PREG CNTR PRENATAL PN    URINE DRUG SCREEN W/CONF (AR)    Chlamydia/GC, PCR (Genital/Anal swab)    VAGINAL PATHOGENS DNA PANEL        HPI    Review of Systems   Constitutional:  Negative.    HENT: Negative.     Eyes: Negative.    Respiratory: Negative.     Cardiovascular: Negative.    Gastrointestinal: Negative.    Genitourinary: Negative.    Musculoskeletal: Negative.    Skin: Negative.    Neurological: Negative.    Endo/Heme/Allergies: Negative.    Psychiatric/Behavioral: Negative.     All other systems reviewed and are negative.      Objective     BP 98/60   Wt 140 lb 9.6 oz   LMP 11/02/2023   BMI 26.21 kg/m²      Physical Exam  Vitals and nursing note reviewed. Exam conducted with a chaperone present.   Constitutional:       Appearance: Normal appearance. She is normal weight.   HENT:      Head: Normocephalic.      Nose: Nose normal.   Eyes:      Extraocular Movements: Extraocular movements intact.   Cardiovascular:      Rate and Rhythm: Normal rate and regular rhythm.      Pulses: Normal pulses.      Heart sounds: Normal heart sounds.   Pulmonary:      Effort: Pulmonary effort is normal.      Breath sounds: Normal breath sounds.   Abdominal:      Palpations: Abdomen is soft.   Genitourinary:     General: Normal vulva.      Rectum: Normal.   Musculoskeletal:         General: Normal range of motion.      Cervical back: Normal range of motion and neck supple.   Skin:     General: Skin is warm and dry.      Capillary Refill: Capillary refill takes less than 2 seconds.   Neurological:      General: No focal deficit present.      Mental Status: She is alert and oriented to person, place, and time.   Psychiatric:         Mood and Affect: Mood normal.         Behavior: Behavior normal.         Thought Content: Thought content normal.         Judgment: Judgment normal.         Assessment & Plan       1. Encounter for supervision of other normal pregnancy, second trimester  ERICK 8/8/2024 per LMP  - PREG CNTR PRENATAL PN; Future  - US-OB 2ND 3RD TRI COMPLETE; Future  - URINE DRUG SCREEN W/CONF (AR); Future  - Chlamydia/GC, PCR (Genital/Anal swab); Future  - VAGINAL PATHOGENS DNA PANEL;  Future

## 2024-02-07 NOTE — PROGRESS NOTES
Pt. Here for NOB visit.  # [phone] (home)   Pt had U/S done on 1/9/2024  LMP 11/2/2023  Last pap smear 1 yr ago WNL per pt in [place], pt would like to wait until post partum to do.   Pt. States no complaints.   EPDS = 0

## 2024-02-08 ENCOUNTER — TELEPHONE (OUTPATIENT)
Dept: OBGYN | Facility: CLINIC | Age: 29
End: 2024-02-08
Payer: COMMERCIAL

## 2024-02-08 DIAGNOSIS — N76.0 BV (BACTERIAL VAGINOSIS): ICD-10-CM

## 2024-02-08 DIAGNOSIS — B96.89 BV (BACTERIAL VAGINOSIS): ICD-10-CM

## 2024-02-08 LAB
C TRACH DNA GENITAL QL NAA+PROBE: NEGATIVE
CANDIDA DNA VAG QL PROBE+SIG AMP: NEGATIVE
G VAGINALIS DNA VAG QL PROBE+SIG AMP: POSITIVE
N GONORRHOEA DNA GENITAL QL NAA+PROBE: NEGATIVE
SPECIMEN SOURCE: NORMAL
T VAGINALIS DNA VAG QL PROBE+SIG AMP: NEGATIVE

## 2024-02-08 RX ORDER — METRONIDAZOLE 500 MG/1
500 TABLET ORAL 2 TIMES DAILY
Qty: 14 TABLET | Refills: 0 | Status: SHIPPED | OUTPATIENT
Start: 2024-02-08 | End: 2024-02-15

## 2024-02-08 NOTE — TELEPHONE ENCOUNTER
----- Message from Leslee Carreon C.N.M. sent at 2/8/2024  1:08 PM PST -----  BV positive- RX sent to pharmacy on file    2/8/2024 1405   ID: [id number]  Left message for pt to call back regarding lab results.     1424   ID: [id number]  Called pt and informed her test came back positive for BV, explained this is not an STI. Pt informed she needs to start antibiotics. Should take the full Tx and advised to take meds with food but not with milk and to avoid alcohol and intercourse while on Tx. Pharmacy verified with pt. Pt agreed and verbalized understanding.      Tranexamic Acid Pregnancy And Lactation Text: It is unknown if this medication is safe during pregnancy or breast feeding.

## 2024-02-12 ENCOUNTER — APPOINTMENT (OUTPATIENT)
Dept: OBGYN | Facility: CLINIC | Age: 29
End: 2024-02-12
Payer: COMMERCIAL

## 2024-03-04 ENCOUNTER — HOSPITAL ENCOUNTER (OUTPATIENT)
Dept: LAB | Facility: MEDICAL CENTER | Age: 29
End: 2024-03-04
Attending: NURSE PRACTITIONER
Payer: COMMERCIAL

## 2024-03-04 DIAGNOSIS — Z34.82 ENCOUNTER FOR SUPERVISION OF OTHER NORMAL PREGNANCY, SECOND TRIMESTER: ICD-10-CM

## 2024-03-04 LAB
ABO GROUP BLD: NORMAL
BLD GP AB SCN SERPL QL: NORMAL
ERYTHROCYTE [DISTWIDTH] IN BLOOD BY AUTOMATED COUNT: 44.7 FL (ref 35.9–50)
HBV SURFACE AG SER QL: ABNORMAL
HCT VFR BLD AUTO: 36.9 % (ref 37–47)
HCV AB SER QL: ABNORMAL
HGB BLD-MCNC: 12.5 G/DL (ref 12–16)
HIV 1+2 AB+HIV1 P24 AG SERPL QL IA: NORMAL
MCH RBC QN AUTO: 29.1 PG (ref 27–33)
MCHC RBC AUTO-ENTMCNC: 33.9 G/DL (ref 32.2–35.5)
MCV RBC AUTO: 86 FL (ref 81.4–97.8)
PLATELET # BLD AUTO: 270 K/UL (ref 164–446)
PMV BLD AUTO: 10.8 FL (ref 9–12.9)
RBC # BLD AUTO: 4.29 M/UL (ref 4.2–5.4)
RH BLD: NORMAL
RUBV AB SER QL: >500 IU/ML
T PALLIDUM AB SER QL IA: ABNORMAL
WBC # BLD AUTO: 10.3 K/UL (ref 4.8–10.8)

## 2024-03-04 PROCEDURE — 80307 DRUG TEST PRSMV CHEM ANLYZR: CPT

## 2024-03-04 PROCEDURE — 86780 TREPONEMA PALLIDUM: CPT

## 2024-03-04 PROCEDURE — 86901 BLOOD TYPING SEROLOGIC RH(D): CPT

## 2024-03-04 PROCEDURE — 85027 COMPLETE CBC AUTOMATED: CPT

## 2024-03-04 PROCEDURE — 86850 RBC ANTIBODY SCREEN: CPT

## 2024-03-04 PROCEDURE — 86803 HEPATITIS C AB TEST: CPT

## 2024-03-04 PROCEDURE — 86900 BLOOD TYPING SEROLOGIC ABO: CPT

## 2024-03-04 PROCEDURE — 86762 RUBELLA ANTIBODY: CPT

## 2024-03-04 PROCEDURE — 87086 URINE CULTURE/COLONY COUNT: CPT

## 2024-03-04 PROCEDURE — 36415 COLL VENOUS BLD VENIPUNCTURE: CPT

## 2024-03-04 PROCEDURE — 87389 HIV-1 AG W/HIV-1&-2 AB AG IA: CPT

## 2024-03-04 PROCEDURE — 87340 HEPATITIS B SURFACE AG IA: CPT

## 2024-03-06 ENCOUNTER — ROUTINE PRENATAL (OUTPATIENT)
Dept: OBGYN | Facility: CLINIC | Age: 29
End: 2024-03-06
Payer: COMMERCIAL

## 2024-03-06 VITALS — SYSTOLIC BLOOD PRESSURE: 108 MMHG | BODY MASS INDEX: 26.65 KG/M2 | DIASTOLIC BLOOD PRESSURE: 64 MMHG | WEIGHT: 143 LBS

## 2024-03-06 DIAGNOSIS — Z34.82 ENCOUNTER FOR SUPERVISION OF OTHER NORMAL PREGNANCY, SECOND TRIMESTER: Primary | ICD-10-CM

## 2024-03-06 LAB
AMPHET CTO UR CFM-MCNC: NEGATIVE NG/ML
BACTERIA UR CULT: NORMAL
BARBITURATES CTO UR CFM-MCNC: NEGATIVE NG/ML
BENZODIAZ CTO UR CFM-MCNC: NEGATIVE NG/ML
CANNABINOIDS CTO UR CFM-MCNC: NEGATIVE NG/ML
COCAINE CTO UR CFM-MCNC: NEGATIVE NG/ML
CREAT UR-MCNC: 156.4 MG/DL (ref 20–400)
DRUG COMMENT 753798: NORMAL
METHADONE CTO UR CFM-MCNC: NEGATIVE NG/ML
OPIATES CTO UR CFM-MCNC: NEGATIVE NG/ML
PCP CTO UR CFM-MCNC: NEGATIVE NG/ML
PROPOXYPH CTO UR CFM-MCNC: NEGATIVE NG/ML
SIGNIFICANT IND 70042: NORMAL
SITE SITE: NORMAL
SOURCE SOURCE: NORMAL

## 2024-03-06 PROCEDURE — 3078F DIAST BP <80 MM HG: CPT | Performed by: NURSE PRACTITIONER

## 2024-03-06 PROCEDURE — 0502F SUBSEQUENT PRENATAL CARE: CPT | Performed by: NURSE PRACTITIONER

## 2024-03-06 PROCEDURE — 3074F SYST BP LT 130 MM HG: CPT | Performed by: NURSE PRACTITIONER

## 2024-03-06 NOTE — PROGRESS NOTES
Pt here today for OB follow up  Pt states no complaints  Reports +FM  Good # [phone] (home)    Pharmacy Confirmed.  Chaperone offered and declined.

## 2024-03-06 NOTE — PROGRESS NOTES
S) Pt is a 29 y.o.   at 17w6d  gestation. Routine prenatal care today. No concerns today, all caught up with labs. Declines AFP and NIPT at this time. US is scheduled for 3/22/2024. Will discuss again after US if necessary. Declined flu vaccine, already educated. PTL/SAB precautions reviewed.    Fetal movement Normal  Cramping no  VB no  LOF no   Denies dysuria. Generally feels well today. Good self-care activities identified. Denies headaches, swelling, visual changes, or epigastric pain .     O) /64   Wt 143 lb         Labs:       PNL: WNL       GCT: Too early        AFP: Not Examined       GBS: N/A       Pertinent ultrasound -        Scheduled for 3/22/2024    A) IUP at 17w6d       S=D         Patient Active Problem List    Diagnosis Date Noted    Encounter for supervision of other normal pregnancy, second trimester 2024    Short interval between pregnancies affecting pregnancy, antepartum 2024    History of third degree perineal laceration 2023          SVE: deferred       Chaperone offered: n/a         TDAP: no       FLU: no        BTL: no       :  n/a       C/S Consent:  n/a       IOL or C/S scheduled: no       LAST PAP: None on file, states done and normal in [place] last year. Will repeat PP         P) s/s ptl vs general discomforts. Fetal movements reviewed. General ed and anticipatory guidance. Nutrition/exercise/vitamin. Plans breast Plans pp contraception- unsure  Continue PNV.

## 2024-03-22 ENCOUNTER — APPOINTMENT (OUTPATIENT)
Dept: RADIOLOGY | Facility: IMAGING CENTER | Age: 29
End: 2024-03-22
Attending: NURSE PRACTITIONER
Payer: COMMERCIAL

## 2024-03-22 DIAGNOSIS — Z34.82 ENCOUNTER FOR SUPERVISION OF OTHER NORMAL PREGNANCY, SECOND TRIMESTER: ICD-10-CM

## 2024-03-22 PROCEDURE — 76805 OB US >/= 14 WKS SNGL FETUS: CPT | Mod: TC | Performed by: NURSE PRACTITIONER

## 2024-04-03 ENCOUNTER — ROUTINE PRENATAL (OUTPATIENT)
Dept: OBGYN | Facility: CLINIC | Age: 29
End: 2024-04-03
Payer: COMMERCIAL

## 2024-04-03 VITALS — SYSTOLIC BLOOD PRESSURE: 102 MMHG | WEIGHT: 147 LBS | DIASTOLIC BLOOD PRESSURE: 62 MMHG | BODY MASS INDEX: 27.4 KG/M2

## 2024-04-03 DIAGNOSIS — Z34.82 ENCOUNTER FOR SUPERVISION OF OTHER NORMAL PREGNANCY, SECOND TRIMESTER: Primary | ICD-10-CM

## 2024-04-03 PROCEDURE — 3074F SYST BP LT 130 MM HG: CPT | Performed by: NURSE PRACTITIONER

## 2024-04-03 PROCEDURE — 0502F SUBSEQUENT PRENATAL CARE: CPT | Performed by: NURSE PRACTITIONER

## 2024-04-03 PROCEDURE — 3078F DIAST BP <80 MM HG: CPT | Performed by: NURSE PRACTITIONER

## 2024-04-03 NOTE — PROGRESS NOTES
Pt here today for OB follow up  Pt states no complaints  Reports +FM  Pharmacy Confirmed.  Chaperone offered and declined.    3/23 US done

## 2024-04-03 NOTE — PROGRESS NOTES
S) Pt is a 29 y.o.   at 21w6d  gestation. Routine prenatal care today. No concerns today. All labs and US are up to date, will discuss and order 3rd trimester labs next visit. PTL precautions reviewed, all questions answered.    Fetal movement Normal  Cramping no  VB no  LOF no   Denies dysuria. Generally feels well today. Good self-care activities identified. Denies headaches, swelling, visual changes, or epigastric pain .     O) /62   Wt 147 lb         Labs:       PNL: WNL       GCT: Too early        AFP: Not Examined       GBS: N/A       Pertinent ultrasound -        3/22/2024- Survey WNL, KATE 9.42cm, c/w prev dating. EFW 50.3%    A) IUP at 21w6d       S=D         Patient Active Problem List    Diagnosis Date Noted    Encounter for supervision of other normal pregnancy, second trimester 2024    Short interval between pregnancies affecting pregnancy, antepartum 2024    History of third degree perineal laceration 2023          SVE: deferred       Chaperone offered: n/a         TDAP: no       FLU: no        BTL: no       :  n/a       C/S Consent:  n/a       IOL or C/S scheduled: no       LAST PAP: None on file, will do at [facility], but also states she had one done in [place] last year that was normal         P) s/s ptl vs general discomforts. Fetal movements reviewed. General ed and anticipatory guidance. Nutrition/exercise/vitamin. Plans breast Plans pp contraception- unsure  Continue PNV.

## 2024-05-20 ENCOUNTER — ROUTINE PRENATAL (OUTPATIENT)
Dept: OBGYN | Facility: CLINIC | Age: 29
End: 2024-05-20
Payer: COMMERCIAL

## 2024-05-20 VITALS — SYSTOLIC BLOOD PRESSURE: 102 MMHG | DIASTOLIC BLOOD PRESSURE: 59 MMHG | BODY MASS INDEX: 28.44 KG/M2 | WEIGHT: 152.6 LBS

## 2024-05-20 DIAGNOSIS — Z87.59 HISTORY OF THIRD DEGREE PERINEAL LACERATION: ICD-10-CM

## 2024-05-20 DIAGNOSIS — O09.899 SHORT INTERVAL BETWEEN PREGNANCIES AFFECTING PREGNANCY, ANTEPARTUM: ICD-10-CM

## 2024-05-20 PROCEDURE — 0502F SUBSEQUENT PRENATAL CARE: CPT | Performed by: NURSE PRACTITIONER

## 2024-05-20 PROCEDURE — 90715 TDAP VACCINE 7 YRS/> IM: CPT | Performed by: NURSE PRACTITIONER

## 2024-05-20 PROCEDURE — 90471 IMMUNIZATION ADMIN: CPT | Performed by: NURSE PRACTITIONER

## 2024-05-20 NOTE — PROGRESS NOTES
OB follow up   Pt states no questions or concerns   + fetal movement.  No VB, LOF or UC's.  Phone # [phone]   Preferred pharmacy confirmed.   [id number]   LINETTE-given today   Tdap- today  BTL- declined at this time

## 2024-05-20 NOTE — LETTER
Cuente los Movimientos de lopez Bebé  Otro paso importante para la verónica de lopez bebé    Poonam Yuliya Turpin     [facility]            Dept: [phone]    ¿Cuántas semanas tiene de embarazo? 28w4d    Fecha cuando tiene que comenzar a contar el movimiento: 05/20/2024                  Tropic debe usar kirby diagrama    Claudia manera en que lopez doctor puede controlar a verónica de lopez bebé es sabiendo cuantas veces se mueve lopez bebé en el útero, o por medio de las “pataditas”.  Usted podrá ayudarle a lopez médico al usar cada día el siguiente diagrama.    Cada día, usted debe prestar atención a cuantas horas le lleva a lopez bebé moverse 10 veces.  Comience a contar en la mañana, lo antes posible después de haberse levantado.    Primeramente, escriba la hora en que se mueve lopez bebé, hasta llegar a 10 veces.  Colóquele un check o palomita a cada cuadrito cada vez que lopez bebé se mueva hasta que complete 10 veces.  Escriba la hora cuando termine de contar 10 veces en la última columna.  Sume el total del tiempo que le llevó contar los 10 movimientos.  Finalmente, complete el cuadrito de cuantas horas le llevó hacerlo.    Después de saadia contado los 10 movimientos, ya no tendrá que contar los demás movimientos por el shaan del día.  A la mañana siguiente, comience a contar de nuevo cuantas veces se mueve el bebé desde el momento en que se levante.    ¿Qué tendría que considerarse un “movimiento”?  Es difícil de decirlo porque es distinto de claudia madre a otra, y de un embarazo a otro.  Lo importante es que cuente el movimiento de la misma manera anabela el transcurso de lopez embarazo.  Si tiene preguntas adicionales, pregúntele a lopez doctor.    ¡Cuente cuidadosamente cada día!     MUESTRA:  Semana 28    ¿Cuántas horas le ha llevado sentir 10 movimientos?        Hora de Inicio     1     2     3     4     5     6     7     8     9     10   Hora de Finlizar   Saji. 8:20           11:40   Mar.                Mié.               Jue.               Vie.               Sáb.               Dom.                 IMPORTANTE:  Usted debe contactar a lopez doctor si le lleva más de 2 horas sentir 10 movimientos de lopez bebé.    Cada mañana, escriba la hora de inicio y comience a contar los movimientos de lopez bebé.  Hágalo colocándole un check o palomita a cada cuadrito cada vez que sienta un movimiento de lopez bebé.  Cuando haya sentido 10 “pataditas”, escriba la hora en que terminó de contar en la última columna.  Luego, complete en la cajita (arriba de la fanny de check o palomita) el número total de horas que le llevó hacerlo.  Asegúrese de leer completamente las instrucciones en la página anterior.

## 2024-05-20 NOTE — PROGRESS NOTES
SUBJECTIVE:  Pt is a 29 y.o.   at 28w4d  gestation. Presents today for follow-up prenatal care. Reports good  fetal movement. Denies regular cramping/contractions, bleeding or leaking of fluid. Denies dysuria, headaches, N/V. Generally feels well today.     OBJECTIVE:  - See prenatal vitals flow  -   Vitals:    24 1417   BP: 102/59   Weight: 152 lb 9.6 oz                 ASSESSMENT:   - IUP at 28w4d    - S=D   -   Patient Active Problem List    Diagnosis Date Noted    Short interval between pregnancies affecting pregnancy, antepartum 2024    History of third degree perineal laceration 2023         PLAN:  - S/sx pregnancy and labor warning signs vs general discomforts discussed  - Fetal movements and/or kick counts reviewed   - Adequate hydration reinforced  - Nutrition/exercise/vitamin education; continue PNV  - S/p Tdap vacc today   - Anticipatory guidance given  - RTC in 4 weeks for follow-up prenatal care

## 2024-06-18 ENCOUNTER — HOSPITAL ENCOUNTER (OUTPATIENT)
Dept: LAB | Facility: MEDICAL CENTER | Age: 29
End: 2024-06-18
Attending: NURSE PRACTITIONER
Payer: COMMERCIAL

## 2024-06-18 DIAGNOSIS — O09.899 SHORT INTERVAL BETWEEN PREGNANCIES AFFECTING PREGNANCY, ANTEPARTUM: ICD-10-CM

## 2024-06-18 LAB
ERYTHROCYTE [DISTWIDTH] IN BLOOD BY AUTOMATED COUNT: 47.8 FL (ref 35.9–50)
GLUCOSE 1H P 50 G GLC PO SERPL-MCNC: 117 MG/DL (ref 70–139)
HCT VFR BLD AUTO: 35.5 % (ref 37–47)
HGB BLD-MCNC: 10.8 G/DL (ref 12–16)
MCH RBC QN AUTO: 27.8 PG (ref 27–33)
MCHC RBC AUTO-ENTMCNC: 30.4 G/DL (ref 32.2–35.5)
MCV RBC AUTO: 91.3 FL (ref 81.4–97.8)
PLATELET # BLD AUTO: 248 K/UL (ref 164–446)
PMV BLD AUTO: 10.7 FL (ref 9–12.9)
RBC # BLD AUTO: 3.89 M/UL (ref 4.2–5.4)
T PALLIDUM AB SER QL IA: NORMAL
WBC # BLD AUTO: 10.7 K/UL (ref 4.8–10.8)

## 2024-06-18 PROCEDURE — 86780 TREPONEMA PALLIDUM: CPT

## 2024-06-18 PROCEDURE — 85027 COMPLETE CBC AUTOMATED: CPT

## 2024-06-18 PROCEDURE — 36415 COLL VENOUS BLD VENIPUNCTURE: CPT

## 2024-06-18 PROCEDURE — 82950 GLUCOSE TEST: CPT

## 2024-06-19 ENCOUNTER — TELEPHONE (OUTPATIENT)
Dept: OBGYN | Facility: CLINIC | Age: 29
End: 2024-06-19
Payer: COMMERCIAL

## 2024-06-19 PROBLEM — O99.013 ANEMIA DURING PREGNANCY IN THIRD TRIMESTER: Status: ACTIVE | Noted: 2024-06-19

## 2024-06-19 RX ORDER — FERROUS SULFATE 325(65) MG
325 TABLET, DELAYED RELEASE (ENTERIC COATED) ORAL
Qty: 60 TABLET | Refills: 2 | Status: SHIPPED | OUTPATIENT
Start: 2024-06-19

## 2024-06-19 NOTE — TELEPHONE ENCOUNTER
----- Message from KRANTHI Swenson sent at 6/19/2024 10:39 AM PDT -----  Please have pt take iron daily and increase her iron rich foods.     6/19/2024 1506   ID: [id number]  Pt notified of need to start on Iron supplementation to take 325 mg daily. Recommended to take it with orange juice/vit c, to avoid dairy products 1hr before and 2hr after. Not to take with PNV. To increase water intake to decrease side effects of constipation. Pt verbalized understanding.

## 2024-06-20 ENCOUNTER — ROUTINE PRENATAL (OUTPATIENT)
Dept: OBGYN | Facility: CLINIC | Age: 29
End: 2024-06-20
Payer: COMMERCIAL

## 2024-06-20 VITALS — BODY MASS INDEX: 28.93 KG/M2 | WEIGHT: 155.2 LBS | DIASTOLIC BLOOD PRESSURE: 62 MMHG | SYSTOLIC BLOOD PRESSURE: 102 MMHG

## 2024-06-20 DIAGNOSIS — Z34.83 ENCOUNTER FOR SUPERVISION OF NORMAL PREGNANCY IN MULTIGRAVIDA IN THIRD TRIMESTER: ICD-10-CM

## 2024-06-20 PROCEDURE — 0502F SUBSEQUENT PRENATAL CARE: CPT | Performed by: PHYSICIAN ASSISTANT

## 2024-06-20 PROCEDURE — 3074F SYST BP LT 130 MM HG: CPT | Performed by: PHYSICIAN ASSISTANT

## 2024-06-20 PROCEDURE — 3078F DIAST BP <80 MM HG: CPT | Performed by: PHYSICIAN ASSISTANT

## 2024-06-20 NOTE — PROGRESS NOTES
Pt. Here for OB/FU. Reports Good FM.   Good # verified  Pt. Denies VB, LOF, or UC's.   Pharmacy verified.   Chaperone offered and not indicated.

## 2024-06-20 NOTE — PROGRESS NOTES
Pt has no complaints with cramping, UCs, Vb, LOF though pt has incr pressure in lower abd, pain where fetal head is pushing on bladder. +FM. PTL precautions stressed, US confirms vtx today. 1hr GTT wnl, but H/H dropping - pt taking gummies, so urged to take FeSO4 daily. RTC 2 wk or sooner prn.

## 2024-07-02 ENCOUNTER — ROUTINE PRENATAL (OUTPATIENT)
Dept: OBGYN | Facility: CLINIC | Age: 29
End: 2024-07-02
Payer: COMMERCIAL

## 2024-07-02 VITALS — SYSTOLIC BLOOD PRESSURE: 94 MMHG | WEIGHT: 155 LBS | BODY MASS INDEX: 28.89 KG/M2 | DIASTOLIC BLOOD PRESSURE: 52 MMHG

## 2024-07-02 DIAGNOSIS — Z34.83 ENCOUNTER FOR SUPERVISION OF OTHER NORMAL PREGNANCY IN THIRD TRIMESTER: ICD-10-CM

## 2024-07-02 PROCEDURE — 3074F SYST BP LT 130 MM HG: CPT | Performed by: NURSE PRACTITIONER

## 2024-07-02 PROCEDURE — 3078F DIAST BP <80 MM HG: CPT | Performed by: NURSE PRACTITIONER

## 2024-07-02 PROCEDURE — 0502F SUBSEQUENT PRENATAL CARE: CPT | Performed by: NURSE PRACTITIONER

## 2024-07-17 ENCOUNTER — APPOINTMENT (OUTPATIENT)
Dept: RADIOLOGY | Facility: IMAGING CENTER | Age: 29
End: 2024-07-17
Attending: NURSE PRACTITIONER
Payer: COMMERCIAL

## 2024-07-17 ENCOUNTER — HOSPITAL ENCOUNTER (OUTPATIENT)
Facility: MEDICAL CENTER | Age: 29
End: 2024-07-17
Payer: COMMERCIAL

## 2024-07-17 ENCOUNTER — ROUTINE PRENATAL (OUTPATIENT)
Dept: OBGYN | Facility: CLINIC | Age: 29
End: 2024-07-17
Payer: COMMERCIAL

## 2024-07-17 VITALS — WEIGHT: 158 LBS | BODY MASS INDEX: 29.45 KG/M2 | DIASTOLIC BLOOD PRESSURE: 62 MMHG | SYSTOLIC BLOOD PRESSURE: 104 MMHG

## 2024-07-17 DIAGNOSIS — Z34.83 ENCOUNTER FOR SUPERVISION OF OTHER NORMAL PREGNANCY IN THIRD TRIMESTER: ICD-10-CM

## 2024-07-17 DIAGNOSIS — Z34.83 PRENATAL CARE, SUBSEQUENT PREGNANCY, THIRD TRIMESTER: ICD-10-CM

## 2024-07-17 PROCEDURE — 87081 CULTURE SCREEN ONLY: CPT

## 2024-07-17 PROCEDURE — 76816 OB US FOLLOW-UP PER FETUS: CPT | Mod: TC | Performed by: NURSE PRACTITIONER

## 2024-07-17 PROCEDURE — 87150 DNA/RNA AMPLIFIED PROBE: CPT

## 2024-07-17 PROCEDURE — 3074F SYST BP LT 130 MM HG: CPT

## 2024-07-17 PROCEDURE — 0502F SUBSEQUENT PRENATAL CARE: CPT

## 2024-07-17 PROCEDURE — 3078F DIAST BP <80 MM HG: CPT

## 2024-07-18 LAB — GP B STREP DNA SPEC QL NAA+PROBE: NEGATIVE

## 2024-07-25 ENCOUNTER — ROUTINE PRENATAL (OUTPATIENT)
Dept: OBGYN | Facility: CLINIC | Age: 29
End: 2024-07-25
Payer: COMMERCIAL

## 2024-07-25 VITALS — BODY MASS INDEX: 29.82 KG/M2 | WEIGHT: 160 LBS | SYSTOLIC BLOOD PRESSURE: 108 MMHG | DIASTOLIC BLOOD PRESSURE: 64 MMHG

## 2024-07-25 DIAGNOSIS — Z34.83 ENCOUNTER FOR SUPERVISION OF OTHER NORMAL PREGNANCY IN THIRD TRIMESTER: ICD-10-CM

## 2024-07-25 PROBLEM — Z34.93 SUPERVISION OF NORMAL PREGNANCY IN THIRD TRIMESTER: Status: ACTIVE | Noted: 2024-07-25

## 2024-07-25 PROCEDURE — 3074F SYST BP LT 130 MM HG: CPT | Performed by: MIDWIFE

## 2024-07-25 PROCEDURE — 0502F SUBSEQUENT PRENATAL CARE: CPT | Performed by: MIDWIFE

## 2024-07-25 PROCEDURE — 3078F DIAST BP <80 MM HG: CPT | Performed by: MIDWIFE

## 2024-07-28 ENCOUNTER — HOSPITAL ENCOUNTER (INPATIENT)
Facility: MEDICAL CENTER | Age: 29
LOS: 2 days | End: 2024-07-30
Attending: OBSTETRICS & GYNECOLOGY | Admitting: OBSTETRICS & GYNECOLOGY
Payer: MEDICAID

## 2024-07-28 LAB
BASOPHILS # BLD AUTO: 0.1 % (ref 0–1.8)
BASOPHILS # BLD: 0.01 K/UL (ref 0–0.12)
EOSINOPHIL # BLD AUTO: 0.05 K/UL (ref 0–0.51)
EOSINOPHIL NFR BLD: 0.5 % (ref 0–6.9)
ERYTHROCYTE [DISTWIDTH] IN BLOOD BY AUTOMATED COUNT: 51.7 FL (ref 35.9–50)
HCT VFR BLD AUTO: 34.3 % (ref 37–47)
HGB BLD-MCNC: 11.1 G/DL (ref 12–16)
IMM GRANULOCYTES # BLD AUTO: 0.04 K/UL (ref 0–0.11)
IMM GRANULOCYTES NFR BLD AUTO: 0.4 % (ref 0–0.9)
LYMPHOCYTES # BLD AUTO: 2.23 K/UL (ref 1–4.8)
LYMPHOCYTES NFR BLD: 22.2 % (ref 22–41)
MCH RBC QN AUTO: 28.5 PG (ref 27–33)
MCHC RBC AUTO-ENTMCNC: 32.4 G/DL (ref 32.2–35.5)
MCV RBC AUTO: 88.2 FL (ref 81.4–97.8)
MONOCYTES # BLD AUTO: 0.56 K/UL (ref 0–0.85)
MONOCYTES NFR BLD AUTO: 5.6 % (ref 0–13.4)
NEUTROPHILS # BLD AUTO: 7.17 K/UL (ref 1.82–7.42)
NEUTROPHILS NFR BLD: 71.2 % (ref 44–72)
NRBC # BLD AUTO: 0 K/UL
NRBC BLD-RTO: 0 /100 WBC (ref 0–0.2)
PLATELET # BLD AUTO: 215 K/UL (ref 164–446)
PMV BLD AUTO: 10.2 FL (ref 9–12.9)
RBC # BLD AUTO: 3.89 M/UL (ref 4.2–5.4)
T PALLIDUM AB SER QL IA: NORMAL
WBC # BLD AUTO: 10.1 K/UL (ref 4.8–10.8)

## 2024-07-28 PROCEDURE — 85025 COMPLETE CBC W/AUTO DIFF WBC: CPT

## 2024-07-28 PROCEDURE — 36415 COLL VENOUS BLD VENIPUNCTURE: CPT

## 2024-07-28 PROCEDURE — 770002 HCHG ROOM/CARE - OB PRIVATE (112)

## 2024-07-28 PROCEDURE — 86780 TREPONEMA PALLIDUM: CPT

## 2024-07-28 RX ORDER — ACETAMINOPHEN 500 MG
1000 TABLET ORAL
Status: DISCONTINUED | OUTPATIENT
Start: 2024-07-28 | End: 2024-07-29 | Stop reason: HOSPADM

## 2024-07-28 RX ORDER — OXYTOCIN 10 [USP'U]/ML
10 INJECTION, SOLUTION INTRAMUSCULAR; INTRAVENOUS
Status: DISCONTINUED | OUTPATIENT
Start: 2024-07-28 | End: 2024-07-29 | Stop reason: HOSPADM

## 2024-07-28 RX ORDER — SODIUM CHLORIDE, SODIUM LACTATE, POTASSIUM CHLORIDE, CALCIUM CHLORIDE 600; 310; 30; 20 MG/100ML; MG/100ML; MG/100ML; MG/100ML
INJECTION, SOLUTION INTRAVENOUS CONTINUOUS
Status: DISCONTINUED | OUTPATIENT
Start: 2024-07-28 | End: 2024-07-29 | Stop reason: ALTCHOICE

## 2024-07-28 RX ORDER — TERBUTALINE SULFATE 1 MG/ML
0.25 INJECTION, SOLUTION SUBCUTANEOUS
Status: DISCONTINUED | OUTPATIENT
Start: 2024-07-28 | End: 2024-07-29 | Stop reason: HOSPADM

## 2024-07-28 RX ORDER — IBUPROFEN 800 MG/1
800 TABLET, FILM COATED ORAL
Status: DISCONTINUED | OUTPATIENT
Start: 2024-07-28 | End: 2024-07-29 | Stop reason: HOSPADM

## 2024-07-28 RX ORDER — LIDOCAINE HYDROCHLORIDE 10 MG/ML
20 INJECTION, SOLUTION INFILTRATION; PERINEURAL
Status: COMPLETED | OUTPATIENT
Start: 2024-07-28 | End: 2024-07-29

## 2024-07-28 RX ORDER — METHYLERGONOVINE MALEATE 0.2 MG/ML
0.2 INJECTION INTRAVENOUS
Status: DISCONTINUED | OUTPATIENT
Start: 2024-07-28 | End: 2024-07-29 | Stop reason: HOSPADM

## 2024-07-28 RX ORDER — MISOPROSTOL 200 UG/1
800 TABLET ORAL
Status: DISCONTINUED | OUTPATIENT
Start: 2024-07-28 | End: 2024-07-29 | Stop reason: HOSPADM

## 2024-07-28 ASSESSMENT — PATIENT HEALTH QUESTIONNAIRE - PHQ9
2. FEELING DOWN, DEPRESSED, IRRITABLE, OR HOPELESS: NOT AT ALL
SUM OF ALL RESPONSES TO PHQ9 QUESTIONS 1 AND 2: 0
1. LITTLE INTEREST OR PLEASURE IN DOING THINGS: NOT AT ALL

## 2024-07-28 ASSESSMENT — LIFESTYLE VARIABLES
EVER_SMOKED: NEVER
ALCOHOL_USE: NO

## 2024-07-28 ASSESSMENT — PAIN DESCRIPTION - PAIN TYPE
TYPE: ACUTE PAIN

## 2024-07-29 LAB
ERYTHROCYTE [DISTWIDTH] IN BLOOD BY AUTOMATED COUNT: 51.9 FL (ref 35.9–50)
HCT VFR BLD AUTO: 29.8 % (ref 37–47)
HGB BLD-MCNC: 9.8 G/DL (ref 12–16)
HOLDING TUBE BB 8507: NORMAL
MCH RBC QN AUTO: 29.1 PG (ref 27–33)
MCHC RBC AUTO-ENTMCNC: 32.9 G/DL (ref 32.2–35.5)
MCV RBC AUTO: 88.4 FL (ref 81.4–97.8)
PLATELET # BLD AUTO: 189 K/UL (ref 164–446)
PMV BLD AUTO: 10.6 FL (ref 9–12.9)
RBC # BLD AUTO: 3.37 M/UL (ref 4.2–5.4)
WBC # BLD AUTO: 12.5 K/UL (ref 4.8–10.8)

## 2024-07-29 PROCEDURE — 10907ZC DRAINAGE OF AMNIOTIC FLUID, THERAPEUTIC FROM PRODUCTS OF CONCEPTION, VIA NATURAL OR ARTIFICIAL OPENING: ICD-10-PCS | Performed by: STUDENT IN AN ORGANIZED HEALTH CARE EDUCATION/TRAINING PROGRAM

## 2024-07-29 PROCEDURE — 59409 OBSTETRICAL CARE: CPT | Performed by: STUDENT IN AN ORGANIZED HEALTH CARE EDUCATION/TRAINING PROGRAM

## 2024-07-29 PROCEDURE — 770002 HCHG ROOM/CARE - OB PRIVATE (112)

## 2024-07-29 PROCEDURE — 59409 OBSTETRICAL CARE: CPT

## 2024-07-29 PROCEDURE — 0KQM0ZZ REPAIR PERINEUM MUSCLE, OPEN APPROACH: ICD-10-PCS | Performed by: STUDENT IN AN ORGANIZED HEALTH CARE EDUCATION/TRAINING PROGRAM

## 2024-07-29 PROCEDURE — 700101 HCHG RX REV CODE 250: Performed by: PHYSICIAN ASSISTANT

## 2024-07-29 PROCEDURE — 36415 COLL VENOUS BLD VENIPUNCTURE: CPT

## 2024-07-29 PROCEDURE — 85027 COMPLETE CBC AUTOMATED: CPT

## 2024-07-29 PROCEDURE — 304965 HCHG RECOVERY SERVICES

## 2024-07-29 PROCEDURE — 700105 HCHG RX REV CODE 258: Performed by: PHYSICIAN ASSISTANT

## 2024-07-29 PROCEDURE — 700111 HCHG RX REV CODE 636 W/ 250 OVERRIDE (IP): Performed by: PHYSICIAN ASSISTANT

## 2024-07-29 RX ORDER — VITAMIN A ACETATE, BETA CAROTENE, ASCORBIC ACID, CHOLECALCIFEROL, .ALPHA.-TOCOPHEROL ACETATE, DL-, THIAMINE MONONITRATE, RIBOFLAVIN, NIACINAMIDE, PYRIDOXINE HYDROCHLORIDE, FOLIC ACID, CYANOCOBALAMIN, CALCIUM CARBONATE, FERROUS FUMARATE, ZINC OXIDE, CUPRIC OXIDE 3080; 12; 120; 400; 1; 1.84; 3; 20; 22; 920; 25; 200; 27; 10; 2 [IU]/1; UG/1; MG/1; [IU]/1; MG/1; MG/1; MG/1; MG/1; MG/1; [IU]/1; MG/1; MG/1; MG/1; MG/1; MG/1
1 TABLET, FILM COATED ORAL
Status: DISCONTINUED | OUTPATIENT
Start: 2024-07-30 | End: 2024-07-30 | Stop reason: HOSPADM

## 2024-07-29 RX ORDER — ACETAMINOPHEN 500 MG
1000 TABLET ORAL EVERY 6 HOURS PRN
Status: DISCONTINUED | OUTPATIENT
Start: 2024-07-29 | End: 2024-07-30 | Stop reason: HOSPADM

## 2024-07-29 RX ORDER — SIMETHICONE 125 MG
125 TABLET,CHEWABLE ORAL 4 TIMES DAILY PRN
Status: DISCONTINUED | OUTPATIENT
Start: 2024-07-29 | End: 2024-07-30 | Stop reason: HOSPADM

## 2024-07-29 RX ORDER — FERROUS SULFATE 325(65) MG
325 TABLET ORAL
Status: DISCONTINUED | OUTPATIENT
Start: 2024-07-30 | End: 2024-07-30 | Stop reason: HOSPADM

## 2024-07-29 RX ORDER — IBUPROFEN 800 MG/1
800 TABLET, FILM COATED ORAL EVERY 8 HOURS PRN
Status: DISCONTINUED | OUTPATIENT
Start: 2024-07-29 | End: 2024-07-30 | Stop reason: HOSPADM

## 2024-07-29 RX ORDER — VITAMIN A ACETATE, BETA CAROTENE, ASCORBIC ACID, CHOLECALCIFEROL, .ALPHA.-TOCOPHEROL ACETATE, DL-, THIAMINE MONONITRATE, RIBOFLAVIN, NIACINAMIDE, PYRIDOXINE HYDROCHLORIDE, FOLIC ACID, CYANOCOBALAMIN, CALCIUM CARBONATE, FERROUS FUMARATE, ZINC OXIDE, CUPRIC OXIDE 3080; 12; 120; 400; 1; 1.84; 3; 20; 22; 920; 25; 200; 27; 10; 2 [IU]/1; UG/1; MG/1; [IU]/1; MG/1; MG/1; MG/1; MG/1; MG/1; [IU]/1; MG/1; MG/1; MG/1; MG/1; MG/1
1 TABLET, FILM COATED ORAL
Status: DISCONTINUED | OUTPATIENT
Start: 2024-07-29 | End: 2024-07-29

## 2024-07-29 RX ORDER — SODIUM CHLORIDE, SODIUM LACTATE, POTASSIUM CHLORIDE, CALCIUM CHLORIDE 600; 310; 30; 20 MG/100ML; MG/100ML; MG/100ML; MG/100ML
INJECTION, SOLUTION INTRAVENOUS PRN
Status: DISCONTINUED | OUTPATIENT
Start: 2024-07-29 | End: 2024-07-30 | Stop reason: HOSPADM

## 2024-07-29 RX ORDER — BISACODYL 10 MG
10 SUPPOSITORY, RECTAL RECTAL PRN
Status: DISCONTINUED | OUTPATIENT
Start: 2024-07-29 | End: 2024-07-30 | Stop reason: HOSPADM

## 2024-07-29 RX ORDER — DOCUSATE SODIUM 100 MG/1
100 CAPSULE, LIQUID FILLED ORAL 2 TIMES DAILY PRN
Status: DISCONTINUED | OUTPATIENT
Start: 2024-07-29 | End: 2024-07-30 | Stop reason: HOSPADM

## 2024-07-29 RX ADMIN — SODIUM CHLORIDE, POTASSIUM CHLORIDE, SODIUM LACTATE AND CALCIUM CHLORIDE: 600; 310; 30; 20 INJECTION, SOLUTION INTRAVENOUS at 01:32

## 2024-07-29 RX ADMIN — OXYTOCIN 125 ML/HR: 10 INJECTION INTRAVENOUS at 09:43

## 2024-07-29 RX ADMIN — LIDOCAINE HYDROCHLORIDE 20 ML: 10 INJECTION, SOLUTION INFILTRATION; PERINEURAL at 09:15

## 2024-07-29 RX ADMIN — OXYTOCIN 10 UNITS: 10 INJECTION INTRAVENOUS at 09:16

## 2024-07-29 RX ADMIN — OXYTOCIN 2 MILLI-UNITS/MIN: 10 INJECTION INTRAVENOUS at 01:33

## 2024-07-29 ASSESSMENT — PAIN DESCRIPTION - PAIN TYPE
TYPE: ACUTE PAIN

## 2024-07-29 NOTE — L&D DELIVERY NOTE
Vaginal Delivery Procedure Note:    Poonam Turpin is a 29 y.o. , now Para  admitted for labor.  Her labor was augmented with pitocin and AROM of forebag.    PreDelivery Diagnosis:  1. SIUP @ 38w4d    PostDelivery Diagnosis:  1. S/p       Procedure in Detail:  Patient began pushing in the dorsal lithotomy position.  The head delivered easily over an intact perineum in the BERT position.  The left anterior shoulder followed easily with gentle downwards pressure followed by the posterior shoulder and body.  There was no nuchal cord but a tight foot cord present at delivery.  Infant was placed on the maternal abdomen and was stimulated and bulb suctioned.  Cord clamping was delayed x60 seconds then the cord was clamped x2 and cut.  Infant APGARs 9 and 8 and 1 and 5 minutes, respectively.  Placenta delivered spontaneously intact with 3 vessel cord.  The vagina and perineum were examined revealing a second degree laceration which was repaired in standard fashion with 3-0 vicryl with resulting hemostasis noted.  The uterus was firm with IV pitocin and fundal massage.  Patient and infant left bonding in LDR.     cc  Anesthesia - 1% lidocaine  Sponge and needle counts correct.  Patient tolerated procedure well.    Anticipate routine postparum care.    Irma Braden M.D.

## 2024-07-29 NOTE — H&P
History and Physical    Poonam Turpin is a 29 y.o. female  -Para:     Gestational Age:  38w3d  Admitted for:   Active Labor  Admitted to  [facility].  [facility]    HPI: Patient is admitted with the above mentioned Chief Complaint and States   Loss of fluid:   negative  Abdominal Pain:  negative  Uterine Contractions:  positive  Vaginal Bleeding:  negative  Fetal Movement:  normal  Patient denies fever, chills, nausea, vomiting , headache, visual disturbance, or dysuria  Patient's last menstrual period was 2023.  Estimated Date of Delivery: 24  Final ERICK: 2024, by Last Menstrual Period    Patient Active Problem List    Diagnosis Date Noted    Supervision of normal pregnancy in third trimester 2024    Anemia during pregnancy in third trimester 2024    Short interval between pregnancies affecting pregnancy, antepartum 2024    History of third degree perineal laceration 2023       Admitting DX: Pregnancy   Pregnancy Complications:  Anemia, hx 3rd degree  OB Risk Factors:   None  Labor State:    Active phase labor.    History:   has no past medical history on file.     has no past surgical history on file.    OB History    Para Term  AB Living   2 1 1     1   SAB IAB Ectopic Molar Multiple Live Births           0 1      # Outcome Date GA Lbr Jason/2nd Weight Sex Delivery Anes PTL Lv   2 Current            1 Term 23 40w1d / 02:39 3.455 kg (7 lb 9.9 oz) F Vag-Spont Local N JOCELIN      Complications: Intraamniotic Infection       Medications:  No current facility-administered medications on file prior to encounter.     Current Outpatient Medications on File Prior to Encounter   Medication Sig Dispense Refill    Prenatal MV-Min-Fe Fum-FA-DHA (PRENATAL 1 PO) Take  by mouth.         Allergies:  Patient has no known allergies.    ROS:   Neuro: negative    Cardiovascular: negative  Gastro  "intestinal: negative  Genitourinary: negative            Physical Exam:  /70   Pulse 94   Temp 36.6 °C (97.9 °F) (Temporal)   Resp 18   Ht 1.549 m (5' 1\")   Wt 72.6 kg (160 lb)   LMP 11/02/2023   SpO2 98%   BMI 30.23 kg/m²   Constitutional: healthy-appearing, Well-developed, well-nourished  No JVD: while supine  HEENT: PERRLA  Breast Exam: negative  Lung: unlabored respirations, no intercostal retractions or accessory muscle use  Abdomen: abdomen is soft without significant tenderness, masses, organomegaly or guarding, gravid  Extremity: extremities, peripheral pulses and reflexes normal    Cervical Exam: 80%  Cervix Dilatation: 6  Station: positive 1  Pelvis: Normal  Fetal Assessment: Fetal heart variability: moderate  Fetal Heart Rate decelerations: none  Fetal Heart Rate accelerations: yes  Baseline FHR: 150 per minute  Uterine contractions: regular, every 2-3 minutes  Estimated Fetal Weight: 3000 - 3500g      Labs:      Prenatal Results       General (Most Recent Result)       Test Value Date Time    ABO  O  03/04/24 1119    Rh  POS  03/04/24 1119    Antibody screen  NEG  03/04/24 1119    HbA1c       Chlamydia by PCR  Negative  02/07/24 1533    Gonorrhea by PCR  Negative  02/07/24 1533    RPR/Syphilus  Non-Reactive  06/18/24 1427    HSV 1/2 by PCR (non-serum)       HSV 1/2 (serum)       HSV 1       HSV 2       HPV (16)       HBsAg  Non-Reactive  03/04/24 1119    HIV-1 HIV-2 Antibodies  Non-Reactive  03/04/24 1119    Rubella  >500 IU/mL 03/04/24 1119    Tb                 Pap Smear (Most Recent Result)       Test Value Date Time    Pap smear       Pap smear w/HPV       Pap smear w/CTNG       Pap smar w/HPV CTNG       Pap smear (reflex HPV ACUS)       Pap smear (reflex HPV ASCUS w/CTNG)       Pathology gyn specimen                 Urinalysis (Most Recent Result)       Test Value Date Time    Urinalysis       POC urinalysis  (See Report)   02/24/23 1600    Urine drug screen (w/ conf)  (See Report)   " 03/04/24 1119    Urine culture ([id number])  (See Report)   03/04/24 1119    Urine Protein/Creatinine Ratio                 Urinalysis, Culture if indicated       Test Value Date Time    Color  Yellow  10/26/22 0623    Appearance  Clear  10/26/22 0623    Specific Gravity  1.009  10/26/22 0623    PH  7.5  10/26/22 0623    Glucose  Negative mg/dL 10/26/22 0623    Ketones  Negative mg/dL 10/26/22 0623    Protein  Negative mg/dL 10/26/22 0623    Bilirubin  Negative  10/26/22 0623    Nitrites  Negative  10/26/22 0623    Leukocytes Esterase  Trace  10/26/22 0623    Blood  Negative  10/26/22 0623    Comment  Microscopic  10/26/22 0623    Culture                 Urine Drug Screen       Test Value Date Time    Amphetamines       Barbiturates  Negative ng/mL 03/04/24 1119    Benzodiazepines  Negative ng/mL 03/04/24 1119    Cocaine  Negative ng/mL 03/04/24 1119    Methadone  Negative ng/mL 03/04/24 1119    Opiates       Oxycodone       Phencylidine  Negative ng/mL 03/04/24 1119    Propoxyphene       Marijuana Metabolite  Negative ng/mL 03/04/24 1119              1st Trimester       Test Value Date Time    Hgb       Hct       Fasting Glucose Tolerance       GTT, 1 hour       GTT, 2 hours       GTT, 3 hours                 2nd Trimester       Test Value Date Time    Hgb  12.5 g/dL 03/04/24 1119    Hct  36.9 % 03/04/24 1119    AST       ALT       Uric Acid       Fasting Glucose Tolerance       GTT, 1 hour       GTT, 2 hours       GTT, 3 hours                 3rd Trimester       Test Value Date Time    Hgb  10.8 g/dL 06/18/24 1427    Hct  35.5 % 06/18/24 1427    Platelet count  248 K/uL 06/18/24 1427    GBS (PRO BROTH)  Negative  07/17/24 1548    Fasting Glucose Tolerance       GTT, 1 hour  117 mg/dL 06/18/24 1427    GTT, 2 hous       GTT, 3hours                 Congenital Disease Screening       Test Value Date Time    First Trimester Screen       Quad Screen       BH Electrophoresis       Cystic Fibrosis Carrier Study       SMA        AFP Maternal Serum        AFP Tetra       NIPT                 Legend    ^: Historical                              Assessment:  Gestational Age:  38w3d  Labor State:   Labor, Active  Risk Factors:   Anemia, hx 3rd degree lac  Pregnancy Complications: None    Patient Active Problem List    Diagnosis Date Noted    Supervision of normal pregnancy in third trimester 2024    Anemia during pregnancy in third trimester 2024    Short interval between pregnancies affecting pregnancy, antepartum 2024    History of third degree perineal laceration 2023       Plan:   Admitted for: Active Labor, GBS neg, pain control prn, augment with AROM prn, anticipate .       MARICEL England.

## 2024-07-29 NOTE — CARE PLAN
The patient is Stable - Low risk of patient condition declining or worsening    Shift Goals  Clinical Goals: Maintain acceptable pain level  Patient Goals: Pain mgmt w/o medications  Family Goals: Support birthing partner      Problem: Altered Physiologic Condition  Goal: Patient physiologically stable as evidenced by normal lochia, palpable uterine involution and vitals within normal limits  Outcome: Progressing  Note: Fundus firm, lochia light      Problem: Pain - Standard  Goal: Alleviation of pain or a reduction in pain to the patient’s comfort goal  Outcome: Progressing  Note: Declines pain intervention at this time

## 2024-07-29 NOTE — PROGRESS NOTES
1135 Assumed care from labor and delivery. In Yoruba, oriented patient to room, call light, emergency light, TV, bed remote. Assessment completed, fundus firm, lochia light. Plan of care reviewed, verbalized understanding. Patient denies pain at this time, will call if pain med intervention needed.

## 2024-07-29 NOTE — PROGRESS NOTES
at 38+3 presents to L+D w/ c/o uc's. Denies LOF or vaginal bleeding, and endorses + FM      Victor Hugo BARROS on unit. SVE and FHT's reviewed. Admission orders received     06 Victor Hugo BARROS at bedside, forebag AROM'd     0700 bedside report given to Aline MAGALLANES, care relinquished

## 2024-07-29 NOTE — CARE PLAN
The patient is Stable - Low risk of patient condition declining or worsening    Shift Goals  Clinical Goals: labor progression w/o tachysystole  Patient Goals: Pain mgmt w/o medications  Family Goals: Support birthing partner    Progress made toward(s) clinical / shift goals:  Progressing       Problem: Pain - Standard  Goal: Alleviation of pain or a reduction in pain to the patient’s comfort goal  Outcome: Progressing  Note: Documented pain using appropriate scale every hour. Provided education for non pharmacological pain mgmt.           Patient

## 2024-07-29 NOTE — PROGRESS NOTES
0700- Received report from nightshift RN. Pt in stable condition and requiring minimal intervention with pain mgmt.    0840- Dr. Braden called to bedside for infant .    0848- Viable baby girl delivered. Mom and baby in stable condition.     0857- Intact placenta delivered.    1135- Pt transported upstairs and handoff report given to postpartum nurse.

## 2024-07-30 ENCOUNTER — PHARMACY VISIT (OUTPATIENT)
Dept: PHARMACY | Facility: MEDICAL CENTER | Age: 29
End: 2024-07-30
Payer: COMMERCIAL

## 2024-07-30 VITALS
HEIGHT: 61 IN | TEMPERATURE: 98.2 F | BODY MASS INDEX: 30.21 KG/M2 | RESPIRATION RATE: 18 BRPM | HEART RATE: 76 BPM | WEIGHT: 160 LBS | DIASTOLIC BLOOD PRESSURE: 74 MMHG | OXYGEN SATURATION: 96 % | SYSTOLIC BLOOD PRESSURE: 109 MMHG

## 2024-07-30 PROCEDURE — A9270 NON-COVERED ITEM OR SERVICE: HCPCS | Performed by: STUDENT IN AN ORGANIZED HEALTH CARE EDUCATION/TRAINING PROGRAM

## 2024-07-30 PROCEDURE — RXMED WILLOW AMBULATORY MEDICATION CHARGE: Performed by: STUDENT IN AN ORGANIZED HEALTH CARE EDUCATION/TRAINING PROGRAM

## 2024-07-30 PROCEDURE — 700102 HCHG RX REV CODE 250 W/ 637 OVERRIDE(OP): Performed by: STUDENT IN AN ORGANIZED HEALTH CARE EDUCATION/TRAINING PROGRAM

## 2024-07-30 RX ORDER — IBUPROFEN 600 MG/1
600 TABLET, FILM COATED ORAL EVERY 8 HOURS PRN
Qty: 30 TABLET | Refills: 0 | Status: SHIPPED | OUTPATIENT
Start: 2024-07-30

## 2024-07-30 RX ORDER — PSEUDOEPHEDRINE HCL 30 MG
100 TABLET ORAL 2 TIMES DAILY PRN
Qty: 60 CAPSULE | Refills: 0 | Status: SHIPPED | OUTPATIENT
Start: 2024-07-30

## 2024-07-30 RX ORDER — ACETAMINOPHEN 500 MG
1000 TABLET ORAL EVERY 6 HOURS PRN
Qty: 30 TABLET | Refills: 0 | Status: SHIPPED | OUTPATIENT
Start: 2024-07-30

## 2024-07-30 RX ADMIN — FERROUS SULFATE TAB 325 MG (65 MG ELEMENTAL FE) 325 MG: 325 (65 FE) TAB at 07:34

## 2024-07-30 RX ADMIN — PRENATAL WITH FERROUS FUM AND FOLIC ACID 1 TABLET: 3080; 920; 120; 400; 22; 1.84; 3; 20; 10; 1; 12; 200; 27; 25; 2 TABLET ORAL at 07:34

## 2024-07-30 ASSESSMENT — EDINBURGH POSTNATAL DEPRESSION SCALE (EPDS)
I HAVE BEEN ANXIOUS OR WORRIED FOR NO GOOD REASON: NO, NOT AT ALL
I HAVE BLAMED MYSELF UNNECESSARILY WHEN THINGS WENT WRONG: NO, NEVER
I HAVE LOOKED FORWARD WITH ENJOYMENT TO THINGS: AS MUCH AS I EVER DID
I HAVE FELT SAD OR MISERABLE: NO, NOT AT ALL
THE THOUGHT OF HARMING MYSELF HAS OCCURRED TO ME: NEVER
I HAVE FELT SCARED OR PANICKY FOR NO GOOD REASON: NO, NOT AT ALL
I HAVE BEEN SO UNHAPPY THAT I HAVE HAD DIFFICULTY SLEEPING: NOT AT ALL
THINGS HAVE BEEN GETTING ON TOP OF ME: NO, I HAVE BEEN COPING AS WELL AS EVER
I HAVE BEEN ABLE TO LAUGH AND SEE THE FUNNY SIDE OF THINGS: AS MUCH AS I ALWAYS COULD
I HAVE BEEN SO UNHAPPY THAT I HAVE BEEN CRYING: NO, NEVER

## 2024-07-30 NOTE — LACTATION NOTE
This note was copied from a baby's chart.  MOB Israeli speaking used ipad  Katia #[id number]. Baby 38.4 weeks, , MOB is still breastfeeding 16 month old, couplet to be discharged today. Mother reports breastfeeding is going well, baby is latching frequently. Mother denies breastfeeding pain during feeds. Baby asleep, latch not attempted at this time.     Feed baby with feeding cues and at least a minimum of 8x/24 hours.  Expect cluster feeding as this is normal during early days of life and growth spurts.  It is not recommended to let baby sleep longer than 4 hours between feedings and if sleepy, place skin to skin to promote feeding interest and milk production.  Baby's usually feed more frequently and longer when skin to skin with mother. It is not recommended to use pacifiers.     MOB has Medicaid pending & [facility].     Breastfeeding plan:  Breastfeed on cue a minimum 8 or more times in 24 hours no longer than 3 hours from last feed.   DISCHARGE

## 2024-07-30 NOTE — DISCHARGE INSTRUCTIONS
Pelvic rest x6 weeks  Return for follow up visit in 5-6 weeks.  Continue to take your iron supplement until your follow up appointment. Take it with foods high in vitamin C to increase absorption.  Call or come to ED for: heavy vaginal bleeding, fever >100.4, severe abdominal pain, severe headache, chest pain, shortness of breath,  N/V, or other concerns.  PATIENT DISCHARGE EDUCATION INSTRUCTION SHEET    REASONS TO CALL YOUR OBSTETRICIAN  Persistent fever, shaking, chills (Temperature higher than 100.4) may indicate you have an infection  Heavy bleeding: soaking more than 1 pad per hour; Passing clots an egg-sized clot or bigger may mean you have an postpartum hemorrhage  Foul odor from vagina or bad smelling or discolored discharge or blood  Breast infection (Mastitis symptoms); breast pain, chills, fever, redness or red streaks, may feel flu like symptoms  Urinary pain, burning or frequency  Incision that is not healing, increased redness, swelling, tenderness or pain, or any pus from episiotomy or  site may mean you have an infection  Redness, swelling, warmth, or painful to touch in the calf area of your leg may mean you have a blood clot  Severe or intensified depression, thoughts or feelings of wanting to hurt yourself or someone else   Pain in chest, obstructed breathing or shortness of breath (trouble catching your breath) may mean you are having a postpartum complication. Call your provider immediately   Headache that does not get better, even after taking medicine, a bad headache with vision changes or pain in the upper right area of your belly may mean you have high blood pressure or post birth preeclampsia. Call your provider immediately    HAND WASHING  All family and friends should wash their hands:  Before and after holding the baby  Before feeding the baby  After using the restroom or changing the baby's diaper    WOUND CARE  Ask your physician for additional care instructions. In  general:   Incision:  May shower and pat incision dry   Keep the incision clean and dry  There should not be any opening or pus from the incision  Continue to walk at home 3 times a day   Do NOT lift anything heavier than your baby (over 10 pounds)  Encourage family to help participate in care of the  to allow rest and mom time to heal  Episiotomy/Laceration  May use danelle-spray bottle, witch hazel pads and dermaplast spray for comfort  Use danelle-spray bottle after urinating to cleanse perineal area  To prevent burning during urination spray danelle-water bottle on labial area   Pat perineal area dry until episiotomy/laceration is healed  Continue to use danelle-bottle until bleeding stops as needed  If have a 2nd degree laceration or greater, a Sitz bath can offer relief from soreness, burning, and inflammation   Sitz Bath   Sit in 6 inches of warm water and soak laceration as needed until the laceration heals    VAGINAL CARE AND BLEEDING  Nothing inside vagina for 6 weeks:   No sexual intercourse, tampons or douching  Bleeding may continue for 2-4 weeks. Amount and color may vary  Soaking 1 pad or more in an hour for several hours is considered heavy bleeding  Passing large egg sized blood clots can be concerning  If you feel like you have heavy bleeding or are having increasing amount of blood clots call your Obstetrician immediately  If you begin feeling faint upon standing, feeling sick to your stomach, have clammy skin, a really fast heartbeat, have chills, start feeling confused, dizzy, sleepy or weak, or feeling like you're going to faint call your Obstetrician immediately    HYPERTENSION   Preeclampsia or gestational hypertension are types of high blood pressure that only pregnant women can get. It is important for you to be aware of symptoms to seek early intervention and treatment. If you have any of these symptoms immediately call your Obstetrician    Vision changes or blurred vision   Severe  "headache or pain that is unrelieved with medication and will not go away  Persistent pain in upper abdomen or shoulder   Increased swelling of face, feet, or hands  Difficulty breathing or shortness of breath at rest  Urinating less than usual    URINATION AND BOWEL MOVEMENTS  Eating more fiber (bran cereal, fruits, and vegetables) and drinking plenty of fluids will help to avoid constipation  Urinary frequency and urgency after childbirth is normal  If you experience any urinary pain, burning or frequency call your provider    BIRTH CONTROL  It is possible to become pregnant at any time after delivery and while breastfeeding  Plan to discuss a method of birth control with your physician at your post delivery follow up visit    POSTPARTUM BLUES  During the first few days after birth, you may experience a sense of the \"blues\" which may include impatience, irritability or even crying. These feelings come and go quickly. However, as many as 1 in 10 women experience emotional symptoms known as postpartum depression.     POSTPARTUM DEPRESSION    May start as early as the second or third day after delivery or take several weeks or months to develop. Symptoms of \"blues\" are present, but are more intense: Crying spells; loss of appetite; feelings of hopelessness or loss of control; fear of touching the baby; over concern or no concern at all about the baby; little or no concern about your own appearance/caring for yourself; and/or inability to sleep or excessive sleeping. Contact your Obstetrician if you are experiencing any of these symptoms     PREVENTING SHAKEN BABY  If you are angry or stressed, PUT THE BABY IN THE CRIB, step away, take some deep breaths, and wait until you are calm to care for the baby. DO NOT SHAKE THE BABY. You are not alone, call a supporter for help.  [facility] 24/7 crisis call line (013-700-6151) or (1-579.178.3188)  You can also text them, text \"ANSWER\" (290252)  "

## 2024-07-30 NOTE — CARE PLAN
The patient is Stable - Low risk of patient condition declining or worsening    Shift Goals  Clinical Goals: Pain control  Patient Goals: bond with infant  Family Goals: support    Progress made toward(s) clinical / shift goals:  Pain well controlled     Patient is not progressing towards the following goals:

## 2024-07-30 NOTE — CARE PLAN
The patient is Stable - Low risk of patient condition declining or worsening    Shift Goals  Clinical Goals: VSS, lochia light, fundus firm  Patient Goals: bond with infant  Family Goals: support    Progress made toward(s) clinical / shift goals:  Pt is able to care for self and infant. Pt is bonding with infant. Fundus is firm and lochia is scant.       Problem: Pain - Standard  Goal: Alleviation of pain or a reduction in pain to the patient’s comfort goal  Outcome: Progressing     Problem: Knowledge Deficit - Standard  Goal: Patient and family/care givers will demonstrate understanding of plan of care, disease process/condition, diagnostic tests and medications  Outcome: Progressing     Problem: Knowledge Deficit - Postpartum  Goal: Patient will verbalize and demonstrate understanding of self and infant care  Outcome: Progressing     Problem: Psychosocial - Postpartum  Goal: Patient will verbalize and demonstrate effective bonding and parenting behavior  Outcome: Progressing     Problem: Altered Physiologic Condition  Goal: Patient physiologically stable as evidenced by normal lochia, palpable uterine involution and vitals within normal limits  Outcome: Progressing     Problem: Infection - Postpartum  Goal: Postpartum patient will be free of signs and symptoms of infection  Outcome: Progressing       Patient is not progressing towards the following goals:

## 2024-07-30 NOTE — DISCHARGE SUMMARY
Discharge Summary:     Date of Admission: 2024  Date of Discharge: 24      Admitting diagnosis:    1. Pregnancy @ 38w4d  2. Anemia  3. Short interpregnancy interval      Discharge Diagnosis:   1. Status post vaginal, spontaneous on 24.  2. Anemia    No past medical history on file.  OB History    Para Term  AB Living   2 2 2     2   SAB IAB Ectopic Molar Multiple Live Births           0 2      # Outcome Date GA Lbr Jason/2nd Weight Sex Delivery Anes PTL Lv   2 Term 24 38w4d / 00:08 2.98 kg (6 lb 9.1 oz) F Vag-Spont Local N JOCELIN   1 Term 23 40w1d / 02:39 3.455 kg (7 lb 9.9 oz) F Vag-Spont Local N JOCELIN      Complications: Intraamniotic Infection     No past surgical history on file.  Patient has no known allergies.    Patient Active Problem List   Diagnosis    History of third degree perineal laceration    Short interval between pregnancies affecting pregnancy, antepartum    Anemia during pregnancy in third trimester    Supervision of normal pregnancy in third trimester    Labor and delivery, indication for care       Hospital Course:   Pt is a 29 y.o. now  who presented on 2024 in active labor.  Single female infant was delivered via  at 38w4d. Apgars 9, 8 at 1 and 5 minutes respectively.  ml.     Postpartum course notable for early ambulation, well managed pain, tolerance of diet, spontaneous voiding, and appropriate feeding of infant. She has remained afebrile and blood pressure has been well controlled. All maternal questions and concerns addressed    Physical Exam:  Temp:  [36.2 °C (97.1 °F)-37.3 °C (99.2 °F)] 36.8 °C (98.2 °F)  Pulse:  [64-91] 76  Resp:  [18] 18  BP: (105-125)/(54-78) 109/74  SpO2:  [94 %-98 %] 96 %  Physical Exam  General: well appearing, no apparent distress  CV: RRR, nl S1 and S2  Resp: unlabored, symmetric chest rise, CTAB  Abdomen: soft, nontender, nondistended  Fundus: firm at level below umbilicus  Perineum:  Deferred  Extremities: symmetric, no peripheral edema, calves nontender    Current Facility-Administered Medications   Medication Dose    lactated ringers infusion      docusate sodium (Colace) capsule 100 mg  100 mg    ibuprofen (Motrin) tablet 800 mg  800 mg    acetaminophen (Tylenol) tablet 1,000 mg  1,000 mg    PRN oxytocin (PITOCIN) (20 Units/1000 mL) PRN for excessive uterine bleeding - See Admin Instr  125-999 mL/hr    bisacodyl (Dulcolax) suppository 10 mg  10 mg    magnesium hydroxide (Milk Of Magnesia) suspension 30 mL  30 mL    prenatal plus vitamin (Stuartnatal 1+1) 27-1 MG tablet 1 Tablet  1 Tablet    simethicone (Mylicon) chewable tablet 125 mg  125 mg    ferrous sulfate tablet 325 mg  325 mg       Recent Labs     07/28/24  2200 07/29/24  1740   WBC 10.1 12.5*   RBC 3.89* 3.37*   HEMOGLOBIN 11.1* 9.8*   HEMATOCRIT 34.3* 29.8*   MCV 88.2 88.4   MCH 28.5 29.1   MCHC 32.4 32.9   RDW 51.7* 51.9*   PLATELETCT 215 189   MPV 10.2 10.6         Activity/ Discharge Instructions::   Discharge to home  Pelvic Rest x 6 weeks  No heavy lifting x4 weeks  Call or come to ED for: heavy vaginal bleeding, fever >100.4, severe abdominal pain, severe headache, chest pain, shortness of breath,  N/V, incisional drainage, or other concerns.  Contraception: undecided; discussed options       Follow up:  [facility] in 5 weeks for vaginal delivery    Discharge Meds:   Current Outpatient Medications   Medication Sig Dispense Refill    acetaminophen (TYLENOL) 500 MG Tab Take 2 Tablets by mouth every 6 hours as needed for Moderate Pain. 30 Tablet 0    docusate sodium 100 MG Cap Take 100 mg by mouth 2 times a day as needed for Constipation. 60 Capsule 0    ibuprofen (MOTRIN) 600 MG Tab Take 1 Tablet by mouth every 8 hours as needed for Moderate Pain. 30 Tablet 0     Continue ferrous sulfate through follow up visit in 5-6 weeks.        Irma Braden M.D.

## 2024-09-13 ENCOUNTER — POST PARTUM (OUTPATIENT)
Dept: OBGYN | Facility: CLINIC | Age: 29
End: 2024-09-13
Payer: COMMERCIAL

## 2024-09-13 VITALS — WEIGHT: 146 LBS | DIASTOLIC BLOOD PRESSURE: 70 MMHG | SYSTOLIC BLOOD PRESSURE: 100 MMHG | BODY MASS INDEX: 27.59 KG/M2

## 2024-09-13 PROCEDURE — 0503F POSTPARTUM CARE VISIT: CPT

## 2024-09-13 PROCEDURE — 3074F SYST BP LT 130 MM HG: CPT

## 2024-09-13 PROCEDURE — 3078F DIAST BP <80 MM HG: CPT

## 2024-09-13 NOTE — PROGRESS NOTES
Subjective:    Poonam Turpin is a 29 y.o. female who presents for her postpartum exam 6 weeks following . Her prenatal course was anemia and short interpregnancy interval.  Her delivery was  uncomplicated. Her method of feeding infant is breast. She desires to use an IUD for her birth control method. Reports no sex prior to this appointment. Patient denies crying spells, irritability, or mood swings.     Denies breast problems, dysuria, vaginal bleeding, or vaginal irritation. Reports eating a regular diet without difficulty and having normal bowel movements.     Patient Active Problem List    Diagnosis Date Noted    Labor and delivery, indication for care 2024    Supervision of normal pregnancy in third trimester 2024    Anemia during pregnancy in third trimester 2024    Short interval between pregnancies affecting pregnancy, antepartum 2024    History of third degree perineal laceration 2023       Objective:      Vitals:    24 1504   BP: 100/70   Weight: 146 lb     Postpartum Depression: Low Risk  (2024)    Echo  Depression Scale     Last EPDS Total Score: 0     Last EPDS Self Harm Result: Never       Physical Exam:  General: well nourished female in no acute distress; A&O x 3  Lungs: respirations clear and non labored on room air  Heart: regular rate and rhythm; pulses WNL bilaterally in lower extremities  Musculoskeletal: no swelling to bilateral lower extremities; 1cm separation of abdominal muscles  GI: BS x 4; no guarding or tenderness; uterus non-palpable  Breasts: no erythema or discharge. No masses or tenderness.     Genitourinary: declined, no concerns today      Assessment:    1. PP care of lactating woman  2. PP exam WNL  3. Pap WNL  in MX  4. Desires contraception      Plan:    1. Offered breastfeeding support   2. Continue PNV  3. Contraceptive counseling: patient desires IUD. Discussed MOA, efficacy, insertion process,  use of method. Information sent to [facility] for scheduling with chas.  4. Discussed diet, exercise and resumption of sexual activity.  Discussed signs and symptoms of stress incontinence and reviewed pelvic floor exercises.    5. Follow up in 1 year for routine care or as needed      Natali Duran CNM

## 2024-09-13 NOTE — PROGRESS NOTES
Pt here today for postpartum exam.  Delivery type: vag 7/29/24   Currently : breast feeding   Desired BCM:  would like to talk about her options   LMP: 9/13/24  Last pap: got it done in  1 year ago WNL   Phone # [phone] (home)

## 2025-07-14 ENCOUNTER — NON-PROVIDER VISIT (OUTPATIENT)
Dept: OBGYN | Facility: CLINIC | Age: 30
End: 2025-07-14
Payer: COMMERCIAL

## 2025-07-14 DIAGNOSIS — N92.6 MISSED PERIOD: Primary | ICD-10-CM

## 2025-07-14 LAB
POCT INT CON NEG: NEGATIVE
POCT INT CON POS: POSITIVE
POCT URINE PREGNANCY TEST: POSITIVE

## 2025-07-14 PROCEDURE — 81025 URINE PREGNANCY TEST: CPT | Performed by: STUDENT IN AN ORGANIZED HEALTH CARE EDUCATION/TRAINING PROGRAM

## 2025-08-18 ENCOUNTER — ANCILLARY PROCEDURE (OUTPATIENT)
Dept: OBGYN | Facility: CLINIC | Age: 30
End: 2025-08-18
Payer: COMMERCIAL

## 2025-08-18 ENCOUNTER — APPOINTMENT (OUTPATIENT)
Dept: OBGYN | Facility: CLINIC | Age: 30
End: 2025-08-18
Payer: COMMERCIAL

## 2025-08-18 DIAGNOSIS — N91.2 AMENORRHEA: ICD-10-CM

## 2025-08-19 ENCOUNTER — APPOINTMENT (OUTPATIENT)
Dept: OBGYN | Facility: CLINIC | Age: 30
End: 2025-08-19
Payer: COMMERCIAL